# Patient Record
Sex: MALE | Race: WHITE | NOT HISPANIC OR LATINO | URBAN - METROPOLITAN AREA
[De-identification: names, ages, dates, MRNs, and addresses within clinical notes are randomized per-mention and may not be internally consistent; named-entity substitution may affect disease eponyms.]

---

## 2022-01-01 ENCOUNTER — OUTPATIENT (OUTPATIENT)
Dept: OUTPATIENT SERVICES | Facility: HOSPITAL | Age: 87
LOS: 1 days | End: 2022-01-01
Payer: MEDICARE

## 2022-01-01 ENCOUNTER — APPOINTMENT (OUTPATIENT)
Dept: FAMILY MEDICINE | Facility: CLINIC | Age: 87
End: 2022-01-01

## 2022-01-01 ENCOUNTER — NON-APPOINTMENT (OUTPATIENT)
Age: 87
End: 2022-01-01

## 2022-01-01 ENCOUNTER — APPOINTMENT (OUTPATIENT)
Dept: VASCULAR SURGERY | Facility: HOSPITAL | Age: 87
End: 2022-01-01

## 2022-01-01 ENCOUNTER — INPATIENT (INPATIENT)
Facility: HOSPITAL | Age: 87
LOS: 13 days | End: 2022-10-20
Attending: STUDENT IN AN ORGANIZED HEALTH CARE EDUCATION/TRAINING PROGRAM | Admitting: FAMILY MEDICINE

## 2022-01-01 ENCOUNTER — APPOINTMENT (OUTPATIENT)
Dept: VASCULAR SURGERY | Facility: CLINIC | Age: 87
End: 2022-01-01

## 2022-01-01 ENCOUNTER — TRANSCRIPTION ENCOUNTER (OUTPATIENT)
Age: 87
End: 2022-01-01

## 2022-01-01 ENCOUNTER — APPOINTMENT (OUTPATIENT)
Dept: ULTRASOUND IMAGING | Facility: CLINIC | Age: 87
End: 2022-01-01

## 2022-01-01 ENCOUNTER — INPATIENT (INPATIENT)
Facility: HOSPITAL | Age: 87
LOS: 3 days | Discharge: HOME HEALTH | End: 2022-10-03
Attending: INTERNAL MEDICINE | Admitting: INTERNAL MEDICINE

## 2022-01-01 ENCOUNTER — APPOINTMENT (OUTPATIENT)
Dept: INTERNAL MEDICINE | Facility: CLINIC | Age: 87
End: 2022-01-01

## 2022-01-01 ENCOUNTER — APPOINTMENT (OUTPATIENT)
Dept: CT IMAGING | Facility: CLINIC | Age: 87
End: 2022-01-01

## 2022-01-01 ENCOUNTER — APPOINTMENT (OUTPATIENT)
Dept: UROLOGY | Facility: CLINIC | Age: 87
End: 2022-01-01

## 2022-01-01 ENCOUNTER — APPOINTMENT (OUTPATIENT)
Dept: CARDIOLOGY | Facility: CLINIC | Age: 87
End: 2022-01-01

## 2022-01-01 VITALS
HEIGHT: 70 IN | WEIGHT: 130.07 LBS | DIASTOLIC BLOOD PRESSURE: 54 MMHG | TEMPERATURE: 100 F | OXYGEN SATURATION: 98 % | HEART RATE: 98 BPM | SYSTOLIC BLOOD PRESSURE: 91 MMHG | RESPIRATION RATE: 24 BRPM

## 2022-01-01 VITALS
SYSTOLIC BLOOD PRESSURE: 98 MMHG | TEMPERATURE: 98 F | DIASTOLIC BLOOD PRESSURE: 61 MMHG | HEIGHT: 70 IN | HEART RATE: 78 BPM | WEIGHT: 130.07 LBS | RESPIRATION RATE: 20 BRPM | OXYGEN SATURATION: 97 %

## 2022-01-01 VITALS — SYSTOLIC BLOOD PRESSURE: 110 MMHG | DIASTOLIC BLOOD PRESSURE: 72 MMHG | HEART RATE: 77 BPM | OXYGEN SATURATION: 95 %

## 2022-01-01 VITALS
HEART RATE: 56 BPM | DIASTOLIC BLOOD PRESSURE: 56 MMHG | SYSTOLIC BLOOD PRESSURE: 122 MMHG | TEMPERATURE: 98 F | OXYGEN SATURATION: 98 %

## 2022-01-01 VITALS
HEART RATE: 60 BPM | OXYGEN SATURATION: 96 % | DIASTOLIC BLOOD PRESSURE: 70 MMHG | SYSTOLIC BLOOD PRESSURE: 115 MMHG | TEMPERATURE: 97.4 F | HEIGHT: 70 IN | BODY MASS INDEX: 19.04 KG/M2 | WEIGHT: 133 LBS

## 2022-01-01 VITALS — RESPIRATION RATE: 35 BRPM

## 2022-01-01 VITALS
BODY MASS INDEX: 19.04 KG/M2 | SYSTOLIC BLOOD PRESSURE: 101 MMHG | OXYGEN SATURATION: 99 % | HEIGHT: 70 IN | TEMPERATURE: 97.6 F | WEIGHT: 133 LBS | DIASTOLIC BLOOD PRESSURE: 63 MMHG | HEART RATE: 77 BPM

## 2022-01-01 DIAGNOSIS — N17.9 ACUTE KIDNEY FAILURE, UNSPECIFIED: ICD-10-CM

## 2022-01-01 DIAGNOSIS — I71.40 ABDOMINAL AORTIC ANEURYSM, WITHOUT RUPTURE, UNSPECIFIED: ICD-10-CM

## 2022-01-01 DIAGNOSIS — E78.5 HYPERLIPIDEMIA, UNSPECIFIED: ICD-10-CM

## 2022-01-01 DIAGNOSIS — E86.0 DEHYDRATION: ICD-10-CM

## 2022-01-01 DIAGNOSIS — J18.9 PNEUMONIA, UNSPECIFIED ORGANISM: ICD-10-CM

## 2022-01-01 DIAGNOSIS — I26.99 OTHER PULMONARY EMBOLISM WITHOUT ACUTE COR PULMONALE: ICD-10-CM

## 2022-01-01 DIAGNOSIS — I50.22 CHRONIC SYSTOLIC (CONGESTIVE) HEART FAILURE: ICD-10-CM

## 2022-01-01 DIAGNOSIS — R26.2 DIFFICULTY IN WALKING, NOT ELSEWHERE CLASSIFIED: ICD-10-CM

## 2022-01-01 DIAGNOSIS — A41.89 OTHER SPECIFIED SEPSIS: ICD-10-CM

## 2022-01-01 DIAGNOSIS — U07.1 COVID-19: ICD-10-CM

## 2022-01-01 DIAGNOSIS — Z87.438 PERSONAL HISTORY OF OTHER DISEASES OF MALE GENITAL ORGANS: ICD-10-CM

## 2022-01-01 DIAGNOSIS — I80.10 PHLEBITIS AND THROMBOPHLEBITIS OF UNSPECIFIED FEMORAL VEIN: ICD-10-CM

## 2022-01-01 DIAGNOSIS — N40.0 BENIGN PROSTATIC HYPERPLASIA WITHOUT LOWER URINARY TRACT SYMPTOMS: ICD-10-CM

## 2022-01-01 DIAGNOSIS — I71.4 ABDOMINAL AORTIC ANEURYSM, WITHOUT RUPTURE: ICD-10-CM

## 2022-01-01 DIAGNOSIS — J12.82 PNEUMONIA DUE TO CORONAVIRUS DISEASE 2019: ICD-10-CM

## 2022-01-01 DIAGNOSIS — I11.0 HYPERTENSIVE HEART DISEASE WITH HEART FAILURE: ICD-10-CM

## 2022-01-01 DIAGNOSIS — I82.402 ACUTE EMBOLISM AND THROMBOSIS OF UNSPECIFIED DEEP VEINS OF LEFT LOWER EXTREMITY: ICD-10-CM

## 2022-01-01 DIAGNOSIS — I50.9 HEART FAILURE, UNSPECIFIED: ICD-10-CM

## 2022-01-01 DIAGNOSIS — Z00.00 ENCOUNTER FOR GENERAL ADULT MEDICAL EXAMINATION W/OUT ABNORMAL FINDINGS: ICD-10-CM

## 2022-01-01 DIAGNOSIS — R53.1 WEAKNESS: ICD-10-CM

## 2022-01-01 DIAGNOSIS — E87.0 HYPEROSMOLALITY AND HYPERNATREMIA: ICD-10-CM

## 2022-01-01 DIAGNOSIS — N40.0 BENIGN PROSTATIC HYPERPLASIA WITHOUT LOWER URINARY TRACT SYMPMS: ICD-10-CM

## 2022-01-01 DIAGNOSIS — I71.4 ABDOMINAL AORTIC ANEURYSM, W/OUT RUPTURE: ICD-10-CM

## 2022-01-01 DIAGNOSIS — I10 ESSENTIAL (PRIMARY) HYPERTENSION: ICD-10-CM

## 2022-01-01 LAB
-  AMIKACIN: SIGNIFICANT CHANGE UP
-  AMOXICILLIN/CLAVULANIC ACID: SIGNIFICANT CHANGE UP
-  AMPICILLIN/SULBACTAM: SIGNIFICANT CHANGE UP
-  AMPICILLIN: SIGNIFICANT CHANGE UP
-  AZTREONAM: SIGNIFICANT CHANGE UP
-  CEFAZOLIN: SIGNIFICANT CHANGE UP
-  CEFEPIME: SIGNIFICANT CHANGE UP
-  CEFTRIAXONE: SIGNIFICANT CHANGE UP
-  CIPROFLOXACIN: SIGNIFICANT CHANGE UP
-  ERTAPENEM: SIGNIFICANT CHANGE UP
-  GENTAMICIN: SIGNIFICANT CHANGE UP
-  IMIPENEM: SIGNIFICANT CHANGE UP
-  LEVOFLOXACIN: SIGNIFICANT CHANGE UP
-  MEROPENEM: SIGNIFICANT CHANGE UP
-  PIPERACILLIN/TAZOBACTAM: SIGNIFICANT CHANGE UP
-  TOBRAMYCIN: SIGNIFICANT CHANGE UP
-  TRIMETHOPRIM/SULFAMETHOXAZOLE: SIGNIFICANT CHANGE UP
25(OH)D3 SERPL-MCNC: 59.9 NG/ML
ACANTHOCYTES BLD QL SMEAR: SIGNIFICANT CHANGE UP
ALBUMIN SERPL ELPH-MCNC: 1.6 G/DL — LOW (ref 3.3–5)
ALBUMIN SERPL ELPH-MCNC: 1.7 G/DL — LOW (ref 3.3–5)
ALBUMIN SERPL ELPH-MCNC: 1.8 G/DL — LOW (ref 3.3–5)
ALBUMIN SERPL ELPH-MCNC: 2 G/DL — LOW (ref 3.3–5)
ALBUMIN SERPL ELPH-MCNC: 2 G/DL — LOW (ref 3.3–5)
ALBUMIN SERPL ELPH-MCNC: 2.3 G/DL — LOW (ref 3.3–5)
ALBUMIN SERPL ELPH-MCNC: 2.3 G/DL — LOW (ref 3.3–5)
ALBUMIN SERPL ELPH-MCNC: 2.4 G/DL — LOW (ref 3.3–5)
ALBUMIN SERPL ELPH-MCNC: 2.4 G/DL — LOW (ref 3.3–5)
ALBUMIN SERPL ELPH-MCNC: 2.8 G/DL — LOW (ref 3.3–5)
ALBUMIN SERPL ELPH-MCNC: 3.4 G/DL
ALP BLD-CCNC: 84 U/L
ALP SERPL-CCNC: 64 U/L — SIGNIFICANT CHANGE UP (ref 40–120)
ALP SERPL-CCNC: 67 U/L — SIGNIFICANT CHANGE UP (ref 40–120)
ALP SERPL-CCNC: 68 U/L — SIGNIFICANT CHANGE UP (ref 40–120)
ALP SERPL-CCNC: 70 U/L — SIGNIFICANT CHANGE UP (ref 40–120)
ALP SERPL-CCNC: 73 U/L — SIGNIFICANT CHANGE UP (ref 40–120)
ALP SERPL-CCNC: 76 U/L — SIGNIFICANT CHANGE UP (ref 40–120)
ALP SERPL-CCNC: 77 U/L — SIGNIFICANT CHANGE UP (ref 40–120)
ALP SERPL-CCNC: 78 U/L — SIGNIFICANT CHANGE UP (ref 40–120)
ALP SERPL-CCNC: 84 U/L — SIGNIFICANT CHANGE UP (ref 40–120)
ALP SERPL-CCNC: 98 U/L — SIGNIFICANT CHANGE UP (ref 40–120)
ALT FLD-CCNC: 18 U/L — SIGNIFICANT CHANGE UP (ref 12–78)
ALT FLD-CCNC: 21 U/L — SIGNIFICANT CHANGE UP (ref 12–78)
ALT FLD-CCNC: 22 U/L — SIGNIFICANT CHANGE UP (ref 12–78)
ALT FLD-CCNC: 22 U/L — SIGNIFICANT CHANGE UP (ref 12–78)
ALT FLD-CCNC: 24 U/L — SIGNIFICANT CHANGE UP (ref 12–78)
ALT FLD-CCNC: 25 U/L — SIGNIFICANT CHANGE UP (ref 12–78)
ALT FLD-CCNC: 25 U/L — SIGNIFICANT CHANGE UP (ref 12–78)
ALT FLD-CCNC: 32 U/L — SIGNIFICANT CHANGE UP (ref 12–78)
ALT FLD-CCNC: 35 U/L — SIGNIFICANT CHANGE UP (ref 12–78)
ALT FLD-CCNC: 49 U/L — SIGNIFICANT CHANGE UP (ref 12–78)
ALT SERPL-CCNC: 15 U/L
ANION GAP SERPL CALC-SCNC: 10 MMOL/L
ANION GAP SERPL CALC-SCNC: 3 MMOL/L — LOW (ref 5–17)
ANION GAP SERPL CALC-SCNC: 5 MMOL/L — SIGNIFICANT CHANGE UP (ref 5–17)
ANION GAP SERPL CALC-SCNC: 6 MMOL/L — SIGNIFICANT CHANGE UP (ref 5–17)
ANION GAP SERPL CALC-SCNC: 7 MMOL/L — SIGNIFICANT CHANGE UP (ref 5–17)
ANION GAP SERPL CALC-SCNC: 8 MMOL/L — SIGNIFICANT CHANGE UP (ref 5–17)
ANISOCYTOSIS BLD QL: SIGNIFICANT CHANGE UP
ANISOCYTOSIS BLD QL: SLIGHT — SIGNIFICANT CHANGE UP
APPEARANCE UR: CLEAR — SIGNIFICANT CHANGE UP
APTT BLD: 32.4 SEC — SIGNIFICANT CHANGE UP (ref 27.5–35.5)
APTT BLD: 33.3 SEC — SIGNIFICANT CHANGE UP (ref 27.5–35.5)
APTT BLD: 34.1 SEC — SIGNIFICANT CHANGE UP (ref 27.5–35.5)
AST SERPL-CCNC: 19 U/L
AST SERPL-CCNC: 19 U/L — SIGNIFICANT CHANGE UP (ref 15–37)
AST SERPL-CCNC: 20 U/L — SIGNIFICANT CHANGE UP (ref 15–37)
AST SERPL-CCNC: 22 U/L — SIGNIFICANT CHANGE UP (ref 15–37)
AST SERPL-CCNC: 24 U/L — SIGNIFICANT CHANGE UP (ref 15–37)
AST SERPL-CCNC: 30 U/L — SIGNIFICANT CHANGE UP (ref 15–37)
AST SERPL-CCNC: 30 U/L — SIGNIFICANT CHANGE UP (ref 15–37)
AST SERPL-CCNC: 34 U/L — SIGNIFICANT CHANGE UP (ref 15–37)
AST SERPL-CCNC: 38 U/L — HIGH (ref 15–37)
BASE EXCESS BLDA CALC-SCNC: -11 MMOL/L — LOW (ref -2–3)
BASE EXCESS BLDA CALC-SCNC: -2.2 MMOL/L — LOW (ref -2–3)
BASE EXCESS BLDA CALC-SCNC: -2.2 MMOL/L — LOW (ref -2–3)
BASE EXCESS BLDA CALC-SCNC: -3.9 MMOL/L — LOW (ref -2–3)
BASE EXCESS BLDA CALC-SCNC: 0.1 MMOL/L — SIGNIFICANT CHANGE UP (ref -2–3)
BASE EXCESS BLDA CALC-SCNC: 3.9 MMOL/L — HIGH (ref -2–3)
BASE EXCESS BLDA CALC-SCNC: 4.7 MMOL/L — HIGH (ref -2–3)
BASOPHILS # BLD AUTO: 0 K/UL — SIGNIFICANT CHANGE UP (ref 0–0.2)
BASOPHILS # BLD AUTO: 0.01 K/UL — SIGNIFICANT CHANGE UP (ref 0–0.2)
BASOPHILS # BLD AUTO: 0.02 K/UL — SIGNIFICANT CHANGE UP (ref 0–0.2)
BASOPHILS # BLD AUTO: 0.02 K/UL — SIGNIFICANT CHANGE UP (ref 0–0.2)
BASOPHILS # BLD AUTO: 0.03 K/UL
BASOPHILS # BLD AUTO: 0.03 K/UL — SIGNIFICANT CHANGE UP (ref 0–0.2)
BASOPHILS # BLD AUTO: 0.08 K/UL — SIGNIFICANT CHANGE UP (ref 0–0.2)
BASOPHILS NFR BLD AUTO: 0 % — SIGNIFICANT CHANGE UP (ref 0–2)
BASOPHILS NFR BLD AUTO: 0.1 % — SIGNIFICANT CHANGE UP (ref 0–2)
BASOPHILS NFR BLD AUTO: 0.2 % — SIGNIFICANT CHANGE UP (ref 0–2)
BASOPHILS NFR BLD AUTO: 0.3 % — SIGNIFICANT CHANGE UP (ref 0–2)
BASOPHILS NFR BLD AUTO: 0.3 % — SIGNIFICANT CHANGE UP (ref 0–2)
BASOPHILS NFR BLD AUTO: 0.4 %
BASOPHILS NFR BLD AUTO: 1 % — SIGNIFICANT CHANGE UP (ref 0–2)
BILIRUB SERPL-MCNC: 0.3 MG/DL — SIGNIFICANT CHANGE UP (ref 0.2–1.2)
BILIRUB SERPL-MCNC: 0.4 MG/DL
BILIRUB SERPL-MCNC: 0.4 MG/DL — SIGNIFICANT CHANGE UP (ref 0.2–1.2)
BILIRUB SERPL-MCNC: 0.5 MG/DL — SIGNIFICANT CHANGE UP (ref 0.2–1.2)
BILIRUB SERPL-MCNC: 0.6 MG/DL — SIGNIFICANT CHANGE UP (ref 0.2–1.2)
BILIRUB UR-MCNC: NEGATIVE — SIGNIFICANT CHANGE UP
BLD GP AB SCN SERPL QL: SIGNIFICANT CHANGE UP
BLOOD GAS COMMENTS ARTERIAL: SIGNIFICANT CHANGE UP
BUN SERPL-MCNC: 19 MG/DL — SIGNIFICANT CHANGE UP (ref 7–23)
BUN SERPL-MCNC: 20 MG/DL — SIGNIFICANT CHANGE UP (ref 7–23)
BUN SERPL-MCNC: 21 MG/DL — SIGNIFICANT CHANGE UP (ref 7–23)
BUN SERPL-MCNC: 22 MG/DL — SIGNIFICANT CHANGE UP (ref 7–23)
BUN SERPL-MCNC: 23 MG/DL — SIGNIFICANT CHANGE UP (ref 7–23)
BUN SERPL-MCNC: 24 MG/DL — HIGH (ref 7–23)
BUN SERPL-MCNC: 25 MG/DL — HIGH (ref 7–23)
BUN SERPL-MCNC: 27 MG/DL — HIGH (ref 7–23)
BUN SERPL-MCNC: 29 MG/DL — HIGH (ref 7–23)
BUN SERPL-MCNC: 29 MG/DL — HIGH (ref 7–23)
BUN SERPL-MCNC: 30 MG/DL — HIGH (ref 7–23)
BUN SERPL-MCNC: 31 MG/DL — HIGH (ref 7–23)
BUN SERPL-MCNC: 32 MG/DL — HIGH (ref 7–23)
BUN SERPL-MCNC: 34 MG/DL
BUN SERPL-MCNC: 34 MG/DL — HIGH (ref 7–23)
BUN SERPL-MCNC: 34 MG/DL — HIGH (ref 7–23)
CALCIUM SERPL-MCNC: 7.6 MG/DL — LOW (ref 8.5–10.1)
CALCIUM SERPL-MCNC: 7.7 MG/DL — LOW (ref 8.5–10.1)
CALCIUM SERPL-MCNC: 7.9 MG/DL — LOW (ref 8.5–10.1)
CALCIUM SERPL-MCNC: 8.1 MG/DL — LOW (ref 8.5–10.1)
CALCIUM SERPL-MCNC: 8.2 MG/DL — LOW (ref 8.5–10.1)
CALCIUM SERPL-MCNC: 8.2 MG/DL — LOW (ref 8.5–10.1)
CALCIUM SERPL-MCNC: 8.4 MG/DL — LOW (ref 8.5–10.1)
CALCIUM SERPL-MCNC: 8.5 MG/DL — SIGNIFICANT CHANGE UP (ref 8.5–10.1)
CALCIUM SERPL-MCNC: 8.6 MG/DL — SIGNIFICANT CHANGE UP (ref 8.5–10.1)
CALCIUM SERPL-MCNC: 8.7 MG/DL — SIGNIFICANT CHANGE UP (ref 8.5–10.1)
CALCIUM SERPL-MCNC: 8.7 MG/DL — SIGNIFICANT CHANGE UP (ref 8.5–10.1)
CALCIUM SERPL-MCNC: 9 MG/DL — SIGNIFICANT CHANGE UP (ref 8.5–10.1)
CALCIUM SERPL-MCNC: 9.4 MG/DL
CHLORIDE SERPL-SCNC: 102 MMOL/L
CHLORIDE SERPL-SCNC: 109 MMOL/L — HIGH (ref 96–108)
CHLORIDE SERPL-SCNC: 109 MMOL/L — HIGH (ref 96–108)
CHLORIDE SERPL-SCNC: 110 MMOL/L — HIGH (ref 96–108)
CHLORIDE SERPL-SCNC: 110 MMOL/L — HIGH (ref 96–108)
CHLORIDE SERPL-SCNC: 111 MMOL/L — HIGH (ref 96–108)
CHLORIDE SERPL-SCNC: 112 MMOL/L — HIGH (ref 96–108)
CHLORIDE SERPL-SCNC: 113 MMOL/L — HIGH (ref 96–108)
CHLORIDE SERPL-SCNC: 114 MMOL/L — HIGH (ref 96–108)
CHLORIDE SERPL-SCNC: 114 MMOL/L — HIGH (ref 96–108)
CHLORIDE SERPL-SCNC: 115 MMOL/L — HIGH (ref 96–108)
CHLORIDE SERPL-SCNC: 115 MMOL/L — HIGH (ref 96–108)
CHLORIDE SERPL-SCNC: 116 MMOL/L — HIGH (ref 96–108)
CHLORIDE SERPL-SCNC: 117 MMOL/L — HIGH (ref 96–108)
CHLORIDE SERPL-SCNC: 118 MMOL/L — HIGH (ref 96–108)
CHLORIDE SERPL-SCNC: 119 MMOL/L — HIGH (ref 96–108)
CHOLEST SERPL-MCNC: 186 MG/DL
CO2 BLDA-SCNC: 23 MMOL/L — SIGNIFICANT CHANGE UP (ref 19–24)
CO2 BLDA-SCNC: 25 MMOL/L — HIGH (ref 19–24)
CO2 BLDA-SCNC: 29 MMOL/L — HIGH (ref 19–24)
CO2 BLDA-SCNC: 30 MMOL/L — HIGH (ref 19–24)
CO2 BLDA-SCNC: 31 MMOL/L — HIGH (ref 19–24)
CO2 BLDA-SCNC: 33 MMOL/L — HIGH (ref 19–24)
CO2 BLDA-SCNC: 33 MMOL/L — HIGH (ref 19–24)
CO2 SERPL-SCNC: 21 MMOL/L — LOW (ref 22–31)
CO2 SERPL-SCNC: 22 MMOL/L — SIGNIFICANT CHANGE UP (ref 22–31)
CO2 SERPL-SCNC: 22 MMOL/L — SIGNIFICANT CHANGE UP (ref 22–31)
CO2 SERPL-SCNC: 23 MMOL/L — SIGNIFICANT CHANGE UP (ref 22–31)
CO2 SERPL-SCNC: 23 MMOL/L — SIGNIFICANT CHANGE UP (ref 22–31)
CO2 SERPL-SCNC: 25 MMOL/L — SIGNIFICANT CHANGE UP (ref 22–31)
CO2 SERPL-SCNC: 26 MMOL/L — SIGNIFICANT CHANGE UP (ref 22–31)
CO2 SERPL-SCNC: 27 MMOL/L — SIGNIFICANT CHANGE UP (ref 22–31)
CO2 SERPL-SCNC: 27 MMOL/L — SIGNIFICANT CHANGE UP (ref 22–31)
CO2 SERPL-SCNC: 28 MMOL/L — SIGNIFICANT CHANGE UP (ref 22–31)
CO2 SERPL-SCNC: 29 MMOL/L
CO2 SERPL-SCNC: 29 MMOL/L — SIGNIFICANT CHANGE UP (ref 22–31)
CO2 SERPL-SCNC: 30 MMOL/L — SIGNIFICANT CHANGE UP (ref 22–31)
CO2 SERPL-SCNC: 30 MMOL/L — SIGNIFICANT CHANGE UP (ref 22–31)
CO2 SERPL-SCNC: 31 MMOL/L — SIGNIFICANT CHANGE UP (ref 22–31)
COLOR SPEC: YELLOW — SIGNIFICANT CHANGE UP
CREAT SERPL-MCNC: 0.84 MG/DL — SIGNIFICANT CHANGE UP (ref 0.5–1.3)
CREAT SERPL-MCNC: 0.86 MG/DL — SIGNIFICANT CHANGE UP (ref 0.5–1.3)
CREAT SERPL-MCNC: 0.91 MG/DL — SIGNIFICANT CHANGE UP (ref 0.5–1.3)
CREAT SERPL-MCNC: 0.92 MG/DL — SIGNIFICANT CHANGE UP (ref 0.5–1.3)
CREAT SERPL-MCNC: 0.96 MG/DL — SIGNIFICANT CHANGE UP (ref 0.5–1.3)
CREAT SERPL-MCNC: 1.05 MG/DL — SIGNIFICANT CHANGE UP (ref 0.5–1.3)
CREAT SERPL-MCNC: 1.07 MG/DL — SIGNIFICANT CHANGE UP (ref 0.5–1.3)
CREAT SERPL-MCNC: 1.07 MG/DL — SIGNIFICANT CHANGE UP (ref 0.5–1.3)
CREAT SERPL-MCNC: 1.1 MG/DL — SIGNIFICANT CHANGE UP (ref 0.5–1.3)
CREAT SERPL-MCNC: 1.12 MG/DL — SIGNIFICANT CHANGE UP (ref 0.5–1.3)
CREAT SERPL-MCNC: 1.12 MG/DL — SIGNIFICANT CHANGE UP (ref 0.5–1.3)
CREAT SERPL-MCNC: 1.13 MG/DL — SIGNIFICANT CHANGE UP (ref 0.5–1.3)
CREAT SERPL-MCNC: 1.15 MG/DL — SIGNIFICANT CHANGE UP (ref 0.5–1.3)
CREAT SERPL-MCNC: 1.17 MG/DL — SIGNIFICANT CHANGE UP (ref 0.5–1.3)
CREAT SERPL-MCNC: 1.19 MG/DL — SIGNIFICANT CHANGE UP (ref 0.5–1.3)
CREAT SERPL-MCNC: 1.29 MG/DL
CREAT SERPL-MCNC: 1.29 MG/DL — SIGNIFICANT CHANGE UP (ref 0.5–1.3)
CREAT SERPL-MCNC: 1.57 MG/DL — HIGH (ref 0.5–1.3)
CRP SERPL-MCNC: 297 MG/L — HIGH
CULTURE RESULTS: NO GROWTH — SIGNIFICANT CHANGE UP
CULTURE RESULTS: SIGNIFICANT CHANGE UP
D DIMER BLD IA.RAPID-MCNC: 7759 NG/ML DDU — HIGH
DIFF PNL FLD: NEGATIVE — SIGNIFICANT CHANGE UP
EGFR: 42 ML/MIN/1.73M2 — LOW
EGFR: 53 ML/MIN/1.73M2
EGFR: 53 ML/MIN/1.73M2 — LOW
EGFR: 58 ML/MIN/1.73M2 — LOW
EGFR: 60 ML/MIN/1.73M2 — SIGNIFICANT CHANGE UP
EGFR: 61 ML/MIN/1.73M2 — SIGNIFICANT CHANGE UP
EGFR: 62 ML/MIN/1.73M2 — SIGNIFICANT CHANGE UP
EGFR: 63 ML/MIN/1.73M2 — SIGNIFICANT CHANGE UP
EGFR: 63 ML/MIN/1.73M2 — SIGNIFICANT CHANGE UP
EGFR: 64 ML/MIN/1.73M2 — SIGNIFICANT CHANGE UP
EGFR: 66 ML/MIN/1.73M2 — SIGNIFICANT CHANGE UP
EGFR: 66 ML/MIN/1.73M2 — SIGNIFICANT CHANGE UP
EGFR: 68 ML/MIN/1.73M2 — SIGNIFICANT CHANGE UP
EGFR: 76 ML/MIN/1.73M2 — SIGNIFICANT CHANGE UP
EGFR: 80 ML/MIN/1.73M2 — SIGNIFICANT CHANGE UP
EGFR: 81 ML/MIN/1.73M2 — SIGNIFICANT CHANGE UP
EGFR: 83 ML/MIN/1.73M2 — SIGNIFICANT CHANGE UP
EGFR: 83 ML/MIN/1.73M2 — SIGNIFICANT CHANGE UP
EOSINOPHIL # BLD AUTO: 0 K/UL — SIGNIFICANT CHANGE UP (ref 0–0.5)
EOSINOPHIL # BLD AUTO: 0.01 K/UL — SIGNIFICANT CHANGE UP (ref 0–0.5)
EOSINOPHIL # BLD AUTO: 0.02 K/UL — SIGNIFICANT CHANGE UP (ref 0–0.5)
EOSINOPHIL # BLD AUTO: 0.16 K/UL
EOSINOPHIL NFR BLD AUTO: 0 % — SIGNIFICANT CHANGE UP (ref 0–6)
EOSINOPHIL NFR BLD AUTO: 0.1 % — SIGNIFICANT CHANGE UP (ref 0–6)
EOSINOPHIL NFR BLD AUTO: 0.3 % — SIGNIFICANT CHANGE UP (ref 0–6)
EOSINOPHIL NFR BLD AUTO: 2.1 %
ESTIMATED AVERAGE GLUCOSE: 126 MG/DL
FERRITIN SERPL-MCNC: 5099 NG/ML — HIGH (ref 30–400)
FLUAV AG NPH QL: SIGNIFICANT CHANGE UP
FLUBV AG NPH QL: SIGNIFICANT CHANGE UP
GAS PNL BLDA: SIGNIFICANT CHANGE UP
GLUCOSE BLDC GLUCOMTR-MCNC: 116 MG/DL — HIGH (ref 70–99)
GLUCOSE BLDC GLUCOMTR-MCNC: 125 MG/DL — HIGH (ref 70–99)
GLUCOSE BLDC GLUCOMTR-MCNC: 141 MG/DL — HIGH (ref 70–99)
GLUCOSE BLDC GLUCOMTR-MCNC: 145 MG/DL — HIGH (ref 70–99)
GLUCOSE BLDC GLUCOMTR-MCNC: 98 MG/DL — SIGNIFICANT CHANGE UP (ref 70–99)
GLUCOSE SERPL-MCNC: 100 MG/DL — HIGH (ref 70–99)
GLUCOSE SERPL-MCNC: 102 MG/DL — HIGH (ref 70–99)
GLUCOSE SERPL-MCNC: 105 MG/DL — HIGH (ref 70–99)
GLUCOSE SERPL-MCNC: 112 MG/DL — HIGH (ref 70–99)
GLUCOSE SERPL-MCNC: 123 MG/DL — HIGH (ref 70–99)
GLUCOSE SERPL-MCNC: 126 MG/DL — HIGH (ref 70–99)
GLUCOSE SERPL-MCNC: 130 MG/DL
GLUCOSE SERPL-MCNC: 131 MG/DL — HIGH (ref 70–99)
GLUCOSE SERPL-MCNC: 132 MG/DL — HIGH (ref 70–99)
GLUCOSE SERPL-MCNC: 132 MG/DL — HIGH (ref 70–99)
GLUCOSE SERPL-MCNC: 136 MG/DL — HIGH (ref 70–99)
GLUCOSE SERPL-MCNC: 137 MG/DL — HIGH (ref 70–99)
GLUCOSE SERPL-MCNC: 163 MG/DL — HIGH (ref 70–99)
GLUCOSE SERPL-MCNC: 170 MG/DL — HIGH (ref 70–99)
GLUCOSE SERPL-MCNC: 88 MG/DL — SIGNIFICANT CHANGE UP (ref 70–99)
GLUCOSE SERPL-MCNC: 90 MG/DL — SIGNIFICANT CHANGE UP (ref 70–99)
GLUCOSE SERPL-MCNC: 93 MG/DL — SIGNIFICANT CHANGE UP (ref 70–99)
GLUCOSE SERPL-MCNC: 99 MG/DL — SIGNIFICANT CHANGE UP (ref 70–99)
GLUCOSE UR QL: NEGATIVE MG/DL — SIGNIFICANT CHANGE UP
GRAM STN FLD: SIGNIFICANT CHANGE UP
GRAM STN FLD: SIGNIFICANT CHANGE UP
HBA1C MFR BLD HPLC: 6 %
HCO3 BLDA-SCNC: 20 MMOL/L — LOW (ref 21–28)
HCO3 BLDA-SCNC: 24 MMOL/L — SIGNIFICANT CHANGE UP (ref 21–28)
HCO3 BLDA-SCNC: 27 MMOL/L — SIGNIFICANT CHANGE UP (ref 21–28)
HCO3 BLDA-SCNC: 28 MMOL/L — SIGNIFICANT CHANGE UP (ref 21–28)
HCO3 BLDA-SCNC: 30 MMOL/L — HIGH (ref 21–28)
HCT VFR BLD CALC: 19.5 % — CRITICAL LOW (ref 39–50)
HCT VFR BLD CALC: 20.1 % — CRITICAL LOW (ref 39–50)
HCT VFR BLD CALC: 21.4 % — LOW (ref 39–50)
HCT VFR BLD CALC: 23.2 % — LOW (ref 39–50)
HCT VFR BLD CALC: 23.4 % — LOW (ref 39–50)
HCT VFR BLD CALC: 23.7 % — LOW (ref 39–50)
HCT VFR BLD CALC: 23.9 % — LOW (ref 39–50)
HCT VFR BLD CALC: 24.7 % — LOW (ref 39–50)
HCT VFR BLD CALC: 25.2 % — LOW (ref 39–50)
HCT VFR BLD CALC: 25.5 % — LOW (ref 39–50)
HCT VFR BLD CALC: 25.8 % — LOW (ref 39–50)
HCT VFR BLD CALC: 26.2 % — LOW (ref 39–50)
HCT VFR BLD CALC: 26.4 % — LOW (ref 39–50)
HCT VFR BLD CALC: 27.2 % — LOW (ref 39–50)
HCT VFR BLD CALC: 27.3 % — LOW (ref 39–50)
HCT VFR BLD CALC: 27.3 % — LOW (ref 39–50)
HCT VFR BLD CALC: 29.8 % — LOW (ref 39–50)
HCT VFR BLD CALC: 29.8 % — LOW (ref 39–50)
HCT VFR BLD CALC: 30.3 % — LOW (ref 39–50)
HCT VFR BLD CALC: 30.4 % — LOW (ref 39–50)
HCT VFR BLD CALC: 30.5 % — LOW (ref 39–50)
HCT VFR BLD CALC: 31 % — LOW (ref 39–50)
HCT VFR BLD CALC: 33.2 % — LOW (ref 39–50)
HCT VFR BLD CALC: 37.7 %
HDLC SERPL-MCNC: 55 MG/DL
HGB BLD-MCNC: 10.1 G/DL — LOW (ref 13–17)
HGB BLD-MCNC: 11.4 G/DL
HGB BLD-MCNC: 6.1 G/DL — CRITICAL LOW (ref 13–17)
HGB BLD-MCNC: 6.3 G/DL — CRITICAL LOW (ref 13–17)
HGB BLD-MCNC: 7 G/DL — CRITICAL LOW (ref 13–17)
HGB BLD-MCNC: 7.2 G/DL — LOW (ref 13–17)
HGB BLD-MCNC: 7.2 G/DL — LOW (ref 13–17)
HGB BLD-MCNC: 7.3 G/DL — LOW (ref 13–17)
HGB BLD-MCNC: 7.4 G/DL — LOW (ref 13–17)
HGB BLD-MCNC: 7.5 G/DL — LOW (ref 13–17)
HGB BLD-MCNC: 7.8 G/DL — LOW (ref 13–17)
HGB BLD-MCNC: 7.9 G/DL — LOW (ref 13–17)
HGB BLD-MCNC: 8.1 G/DL — LOW (ref 13–17)
HGB BLD-MCNC: 8.2 G/DL — LOW (ref 13–17)
HGB BLD-MCNC: 8.2 G/DL — LOW (ref 13–17)
HGB BLD-MCNC: 8.5 G/DL — LOW (ref 13–17)
HGB BLD-MCNC: 9.2 G/DL — LOW (ref 13–17)
HGB BLD-MCNC: 9.4 G/DL — LOW (ref 13–17)
HGB BLD-MCNC: 9.4 G/DL — LOW (ref 13–17)
HGB BLD-MCNC: 9.5 G/DL — LOW (ref 13–17)
HGB BLD-MCNC: 9.5 G/DL — LOW (ref 13–17)
HGB BLD-MCNC: 9.6 G/DL — LOW (ref 13–17)
HOROWITZ INDEX BLDA+IHG-RTO: 100 — SIGNIFICANT CHANGE UP
HOROWITZ INDEX BLDA+IHG-RTO: 70 — SIGNIFICANT CHANGE UP
HOROWITZ INDEX BLDA+IHG-RTO: 70 — SIGNIFICANT CHANGE UP
HYPOCHROMIA BLD QL: SLIGHT — SIGNIFICANT CHANGE UP
IMM GRANULOCYTES NFR BLD AUTO: 0.8 %
IMM GRANULOCYTES NFR BLD AUTO: 0.8 % — SIGNIFICANT CHANGE UP (ref 0–0.9)
IMM GRANULOCYTES NFR BLD AUTO: 0.9 % — SIGNIFICANT CHANGE UP (ref 0–0.9)
IMM GRANULOCYTES NFR BLD AUTO: 1.1 % — HIGH (ref 0–0.9)
IMM GRANULOCYTES NFR BLD AUTO: 1.1 % — HIGH (ref 0–0.9)
IMM GRANULOCYTES NFR BLD AUTO: 1.7 % — HIGH (ref 0–0.9)
IMM GRANULOCYTES NFR BLD AUTO: 2.7 % — HIGH (ref 0–0.9)
INR BLD: 0.95 RATIO — SIGNIFICANT CHANGE UP (ref 0.88–1.16)
INR BLD: 1.05 RATIO — SIGNIFICANT CHANGE UP (ref 0.88–1.16)
INR BLD: 1.13 RATIO — SIGNIFICANT CHANGE UP (ref 0.88–1.16)
KETONES UR-MCNC: NEGATIVE — SIGNIFICANT CHANGE UP
LACTATE SERPL-SCNC: 0.9 MMOL/L — SIGNIFICANT CHANGE UP (ref 0.7–2)
LACTATE SERPL-SCNC: 2 MMOL/L — SIGNIFICANT CHANGE UP (ref 0.7–2)
LDH SERPL L TO P-CCNC: 519 U/L — HIGH (ref 50–242)
LDLC SERPL CALC-MCNC: 110 MG/DL
LEUKOCYTE ESTERASE UR-ACNC: NEGATIVE — SIGNIFICANT CHANGE UP
LYMPHOCYTES # BLD AUTO: 0.08 K/UL — LOW (ref 1–3.3)
LYMPHOCYTES # BLD AUTO: 0.17 K/UL — LOW (ref 1–3.3)
LYMPHOCYTES # BLD AUTO: 0.21 K/UL — LOW (ref 1–3.3)
LYMPHOCYTES # BLD AUTO: 0.32 K/UL — LOW (ref 1–3.3)
LYMPHOCYTES # BLD AUTO: 0.33 K/UL — LOW (ref 1–3.3)
LYMPHOCYTES # BLD AUTO: 0.53 K/UL — LOW (ref 1–3.3)
LYMPHOCYTES # BLD AUTO: 0.68 K/UL — LOW (ref 1–3.3)
LYMPHOCYTES # BLD AUTO: 0.95 K/UL
LYMPHOCYTES # BLD AUTO: 1 % — LOW (ref 13–44)
LYMPHOCYTES # BLD AUTO: 1 % — LOW (ref 13–44)
LYMPHOCYTES # BLD AUTO: 1.15 K/UL — SIGNIFICANT CHANGE UP (ref 1–3.3)
LYMPHOCYTES # BLD AUTO: 10.7 % — LOW (ref 13–44)
LYMPHOCYTES # BLD AUTO: 11.3 % — LOW (ref 13–44)
LYMPHOCYTES # BLD AUTO: 3.7 % — LOW (ref 13–44)
LYMPHOCYTES # BLD AUTO: 3.8 % — LOW (ref 13–44)
LYMPHOCYTES # BLD AUTO: 6.1 % — LOW (ref 13–44)
LYMPHOCYTES # BLD AUTO: 7.7 % — LOW (ref 13–44)
LYMPHOCYTES NFR BLD AUTO: 12.5 %
MACROCYTES BLD QL: SLIGHT — SIGNIFICANT CHANGE UP
MAGNESIUM SERPL-MCNC: 1.9 MG/DL — SIGNIFICANT CHANGE UP (ref 1.6–2.6)
MAGNESIUM SERPL-MCNC: 2 MG/DL — SIGNIFICANT CHANGE UP (ref 1.6–2.6)
MAGNESIUM SERPL-MCNC: 2.1 MG/DL — SIGNIFICANT CHANGE UP (ref 1.6–2.6)
MAGNESIUM SERPL-MCNC: 2.1 MG/DL — SIGNIFICANT CHANGE UP (ref 1.6–2.6)
MAGNESIUM SERPL-MCNC: 2.2 MG/DL — SIGNIFICANT CHANGE UP (ref 1.6–2.6)
MAGNESIUM SERPL-MCNC: 2.2 MG/DL — SIGNIFICANT CHANGE UP (ref 1.6–2.6)
MAGNESIUM SERPL-MCNC: 2.3 MG/DL — SIGNIFICANT CHANGE UP (ref 1.6–2.6)
MAGNESIUM SERPL-MCNC: 2.6 MG/DL — SIGNIFICANT CHANGE UP (ref 1.6–2.6)
MAN DIFF?: NORMAL
MANUAL SMEAR VERIFICATION: SIGNIFICANT CHANGE UP
MANUAL SMEAR VERIFICATION: SIGNIFICANT CHANGE UP
MCHC RBC-ENTMCNC: 27.1 PG — SIGNIFICANT CHANGE UP (ref 27–34)
MCHC RBC-ENTMCNC: 27.2 PG — SIGNIFICANT CHANGE UP (ref 27–34)
MCHC RBC-ENTMCNC: 27.3 PG — SIGNIFICANT CHANGE UP (ref 27–34)
MCHC RBC-ENTMCNC: 27.4 PG — SIGNIFICANT CHANGE UP (ref 27–34)
MCHC RBC-ENTMCNC: 27.4 PG — SIGNIFICANT CHANGE UP (ref 27–34)
MCHC RBC-ENTMCNC: 27.5 PG — SIGNIFICANT CHANGE UP (ref 27–34)
MCHC RBC-ENTMCNC: 27.5 PG — SIGNIFICANT CHANGE UP (ref 27–34)
MCHC RBC-ENTMCNC: 27.7 PG — SIGNIFICANT CHANGE UP (ref 27–34)
MCHC RBC-ENTMCNC: 27.7 PG — SIGNIFICANT CHANGE UP (ref 27–34)
MCHC RBC-ENTMCNC: 27.8 PG — SIGNIFICANT CHANGE UP (ref 27–34)
MCHC RBC-ENTMCNC: 27.9 PG — SIGNIFICANT CHANGE UP (ref 27–34)
MCHC RBC-ENTMCNC: 28 PG — SIGNIFICANT CHANGE UP (ref 27–34)
MCHC RBC-ENTMCNC: 28 PG — SIGNIFICANT CHANGE UP (ref 27–34)
MCHC RBC-ENTMCNC: 28.1 PG — SIGNIFICANT CHANGE UP (ref 27–34)
MCHC RBC-ENTMCNC: 28.2 PG — SIGNIFICANT CHANGE UP (ref 27–34)
MCHC RBC-ENTMCNC: 28.2 PG — SIGNIFICANT CHANGE UP (ref 27–34)
MCHC RBC-ENTMCNC: 28.3 PG — SIGNIFICANT CHANGE UP (ref 27–34)
MCHC RBC-ENTMCNC: 28.4 PG — SIGNIFICANT CHANGE UP (ref 27–34)
MCHC RBC-ENTMCNC: 28.4 PG — SIGNIFICANT CHANGE UP (ref 27–34)
MCHC RBC-ENTMCNC: 29.3 PG
MCHC RBC-ENTMCNC: 30 G/DL — LOW (ref 32–36)
MCHC RBC-ENTMCNC: 30.1 G/DL — LOW (ref 32–36)
MCHC RBC-ENTMCNC: 30.1 G/DL — LOW (ref 32–36)
MCHC RBC-ENTMCNC: 30.2 GM/DL
MCHC RBC-ENTMCNC: 30.4 G/DL — LOW (ref 32–36)
MCHC RBC-ENTMCNC: 30.6 G/DL — LOW (ref 32–36)
MCHC RBC-ENTMCNC: 30.6 G/DL — LOW (ref 32–36)
MCHC RBC-ENTMCNC: 30.8 G/DL — LOW (ref 32–36)
MCHC RBC-ENTMCNC: 30.8 G/DL — LOW (ref 32–36)
MCHC RBC-ENTMCNC: 30.9 G/DL — LOW (ref 32–36)
MCHC RBC-ENTMCNC: 31 G/DL — LOW (ref 32–36)
MCHC RBC-ENTMCNC: 31 G/DL — LOW (ref 32–36)
MCHC RBC-ENTMCNC: 31.1 G/DL — LOW (ref 32–36)
MCHC RBC-ENTMCNC: 31.1 G/DL — LOW (ref 32–36)
MCHC RBC-ENTMCNC: 31.3 G/DL — LOW (ref 32–36)
MCHC RBC-ENTMCNC: 31.5 G/DL — LOW (ref 32–36)
MCHC RBC-ENTMCNC: 31.6 G/DL — LOW (ref 32–36)
MCHC RBC-ENTMCNC: 31.6 G/DL — LOW (ref 32–36)
MCHC RBC-ENTMCNC: 31.8 G/DL — LOW (ref 32–36)
MCHC RBC-ENTMCNC: 31.9 G/DL — LOW (ref 32–36)
MCHC RBC-ENTMCNC: 32.2 G/DL — SIGNIFICANT CHANGE UP (ref 32–36)
MCHC RBC-ENTMCNC: 32.7 G/DL — SIGNIFICANT CHANGE UP (ref 32–36)
MCV RBC AUTO: 84.9 FL — SIGNIFICANT CHANGE UP (ref 80–100)
MCV RBC AUTO: 86 FL — SIGNIFICANT CHANGE UP (ref 80–100)
MCV RBC AUTO: 86.1 FL — SIGNIFICANT CHANGE UP (ref 80–100)
MCV RBC AUTO: 86.4 FL — SIGNIFICANT CHANGE UP (ref 80–100)
MCV RBC AUTO: 86.7 FL — SIGNIFICANT CHANGE UP (ref 80–100)
MCV RBC AUTO: 87 FL — SIGNIFICANT CHANGE UP (ref 80–100)
MCV RBC AUTO: 88.1 FL — SIGNIFICANT CHANGE UP (ref 80–100)
MCV RBC AUTO: 88.6 FL — SIGNIFICANT CHANGE UP (ref 80–100)
MCV RBC AUTO: 88.8 FL — SIGNIFICANT CHANGE UP (ref 80–100)
MCV RBC AUTO: 89.6 FL — SIGNIFICANT CHANGE UP (ref 80–100)
MCV RBC AUTO: 89.9 FL — SIGNIFICANT CHANGE UP (ref 80–100)
MCV RBC AUTO: 90.3 FL — SIGNIFICANT CHANGE UP (ref 80–100)
MCV RBC AUTO: 90.5 FL — SIGNIFICANT CHANGE UP (ref 80–100)
MCV RBC AUTO: 90.6 FL — SIGNIFICANT CHANGE UP (ref 80–100)
MCV RBC AUTO: 90.7 FL — SIGNIFICANT CHANGE UP (ref 80–100)
MCV RBC AUTO: 91 FL — SIGNIFICANT CHANGE UP (ref 80–100)
MCV RBC AUTO: 91 FL — SIGNIFICANT CHANGE UP (ref 80–100)
MCV RBC AUTO: 91.2 FL — SIGNIFICANT CHANGE UP (ref 80–100)
MCV RBC AUTO: 91.5 FL — SIGNIFICANT CHANGE UP (ref 80–100)
MCV RBC AUTO: 91.8 FL — SIGNIFICANT CHANGE UP (ref 80–100)
MCV RBC AUTO: 91.9 FL — SIGNIFICANT CHANGE UP (ref 80–100)
MCV RBC AUTO: 92 FL — SIGNIFICANT CHANGE UP (ref 80–100)
MCV RBC AUTO: 92.3 FL — SIGNIFICANT CHANGE UP (ref 80–100)
MCV RBC AUTO: 96.9 FL
METHOD TYPE: SIGNIFICANT CHANGE UP
MICROCYTES BLD QL: SLIGHT — SIGNIFICANT CHANGE UP
MICROCYTES BLD QL: SLIGHT — SIGNIFICANT CHANGE UP
MONOCYTES # BLD AUTO: 0.05 K/UL — SIGNIFICANT CHANGE UP (ref 0–0.9)
MONOCYTES # BLD AUTO: 0.4 K/UL — SIGNIFICANT CHANGE UP (ref 0–0.9)
MONOCYTES # BLD AUTO: 0.4 K/UL — SIGNIFICANT CHANGE UP (ref 0–0.9)
MONOCYTES # BLD AUTO: 0.45 K/UL — SIGNIFICANT CHANGE UP (ref 0–0.9)
MONOCYTES # BLD AUTO: 0.46 K/UL — SIGNIFICANT CHANGE UP (ref 0–0.9)
MONOCYTES # BLD AUTO: 0.52 K/UL
MONOCYTES # BLD AUTO: 0.57 K/UL — SIGNIFICANT CHANGE UP (ref 0–0.9)
MONOCYTES # BLD AUTO: 0.69 K/UL — SIGNIFICANT CHANGE UP (ref 0–0.9)
MONOCYTES # BLD AUTO: 0.81 K/UL — SIGNIFICANT CHANGE UP (ref 0–0.9)
MONOCYTES NFR BLD AUTO: 1.7 % — LOW (ref 2–14)
MONOCYTES NFR BLD AUTO: 4 % — SIGNIFICANT CHANGE UP (ref 2–14)
MONOCYTES NFR BLD AUTO: 4.1 % — SIGNIFICANT CHANGE UP (ref 2–14)
MONOCYTES NFR BLD AUTO: 5 % — SIGNIFICANT CHANGE UP (ref 2–14)
MONOCYTES NFR BLD AUTO: 5.4 % — SIGNIFICANT CHANGE UP (ref 2–14)
MONOCYTES NFR BLD AUTO: 6.9 %
MONOCYTES NFR BLD AUTO: 7.1 % — SIGNIFICANT CHANGE UP (ref 2–14)
MONOCYTES NFR BLD AUTO: 7.1 % — SIGNIFICANT CHANGE UP (ref 2–14)
MONOCYTES NFR BLD AUTO: 8.8 % — SIGNIFICANT CHANGE UP (ref 2–14)
MRSA PCR RESULT.: SIGNIFICANT CHANGE UP
MRSA PCR RESULT.: SIGNIFICANT CHANGE UP
NEUTROPHILS # BLD AUTO: 12.64 K/UL — HIGH (ref 1.8–7.4)
NEUTROPHILS # BLD AUTO: 12.8 K/UL — HIGH (ref 1.8–7.4)
NEUTROPHILS # BLD AUTO: 16.46 K/UL — HIGH (ref 1.8–7.4)
NEUTROPHILS # BLD AUTO: 2.46 K/UL — SIGNIFICANT CHANGE UP (ref 1.8–7.4)
NEUTROPHILS # BLD AUTO: 4.39 K/UL — SIGNIFICANT CHANGE UP (ref 1.8–7.4)
NEUTROPHILS # BLD AUTO: 4.97 K/UL — SIGNIFICANT CHANGE UP (ref 1.8–7.4)
NEUTROPHILS # BLD AUTO: 5.07 K/UL — SIGNIFICANT CHANGE UP (ref 1.8–7.4)
NEUTROPHILS # BLD AUTO: 5.85 K/UL
NEUTROPHILS # BLD AUTO: 7.38 K/UL — SIGNIFICANT CHANGE UP (ref 1.8–7.4)
NEUTROPHILS NFR BLD AUTO: 77.3 %
NEUTROPHILS NFR BLD AUTO: 79.9 % — HIGH (ref 43–77)
NEUTROPHILS NFR BLD AUTO: 80 % — HIGH (ref 43–77)
NEUTROPHILS NFR BLD AUTO: 83.8 % — HIGH (ref 43–77)
NEUTROPHILS NFR BLD AUTO: 84 % — HIGH (ref 43–77)
NEUTROPHILS NFR BLD AUTO: 85.8 % — HIGH (ref 43–77)
NEUTROPHILS NFR BLD AUTO: 87.9 % — HIGH (ref 43–77)
NEUTROPHILS NFR BLD AUTO: 91 % — HIGH (ref 43–77)
NEUTROPHILS NFR BLD AUTO: 91.1 % — HIGH (ref 43–77)
NEUTS BAND # BLD: 15 % — HIGH (ref 0–8)
NEUTS BAND # BLD: 2 % — SIGNIFICANT CHANGE UP (ref 0–8)
NITRITE UR-MCNC: NEGATIVE — SIGNIFICANT CHANGE UP
NONHDLC SERPL-MCNC: 131 MG/DL
NRBC # BLD: 0 /100 WBCS — SIGNIFICANT CHANGE UP (ref 0–0)
NRBC # BLD: 0 /100 — SIGNIFICANT CHANGE UP (ref 0–0)
NRBC # BLD: 0 /100 — SIGNIFICANT CHANGE UP (ref 0–0)
NRBC # BLD: SIGNIFICANT CHANGE UP /100 WBCS (ref 0–0)
NRBC # BLD: SIGNIFICANT CHANGE UP /100 WBCS (ref 0–0)
NT-PROBNP SERPL-SCNC: 610 PG/ML — HIGH (ref 0–450)
ORGANISM # SPEC MICROSCOPIC CNT: SIGNIFICANT CHANGE UP
ORGANISM # SPEC MICROSCOPIC CNT: SIGNIFICANT CHANGE UP
OVALOCYTES BLD QL SMEAR: SLIGHT — SIGNIFICANT CHANGE UP
PCO2 BLDA: 34 MMHG — SIGNIFICANT CHANGE UP (ref 32–46)
PCO2 BLDA: 38 MMHG — SIGNIFICANT CHANGE UP (ref 32–46)
PCO2 BLDA: 46 MMHG — SIGNIFICANT CHANGE UP (ref 32–46)
PCO2 BLDA: 72 MMHG — CRITICAL HIGH (ref 32–46)
PCO2 BLDA: 82 MMHG — CRITICAL HIGH (ref 32–46)
PCO2 BLDA: 94 MMHG — CRITICAL HIGH (ref 32–46)
PCO2 BLDA: 94 MMHG — CRITICAL HIGH (ref 32–46)
PH BLDA: 7.06 — CRITICAL LOW (ref 7.35–7.45)
PH BLDA: 7.11 — CRITICAL LOW (ref 7.35–7.45)
PH BLDA: 7.11 — CRITICAL LOW (ref 7.35–7.45)
PH BLDA: 7.13 — CRITICAL LOW (ref 7.35–7.45)
PH BLDA: 7.42 — SIGNIFICANT CHANGE UP (ref 7.35–7.45)
PH BLDA: 7.45 — SIGNIFICANT CHANGE UP (ref 7.35–7.45)
PH BLDA: 7.47 — HIGH (ref 7.35–7.45)
PH UR: 6 — SIGNIFICANT CHANGE UP (ref 5–8)
PHOSPHATE SERPL-MCNC: 1.9 MG/DL — LOW (ref 2.5–4.5)
PHOSPHATE SERPL-MCNC: 2.3 MG/DL — LOW (ref 2.5–4.5)
PHOSPHATE SERPL-MCNC: 2.3 MG/DL — LOW (ref 2.5–4.5)
PHOSPHATE SERPL-MCNC: 2.6 MG/DL — SIGNIFICANT CHANGE UP (ref 2.5–4.5)
PHOSPHATE SERPL-MCNC: 2.8 MG/DL — SIGNIFICANT CHANGE UP (ref 2.5–4.5)
PHOSPHATE SERPL-MCNC: 2.8 MG/DL — SIGNIFICANT CHANGE UP (ref 2.5–4.5)
PHOSPHATE SERPL-MCNC: 2.9 MG/DL — SIGNIFICANT CHANGE UP (ref 2.5–4.5)
PHOSPHATE SERPL-MCNC: 3.1 MG/DL — SIGNIFICANT CHANGE UP (ref 2.5–4.5)
PHOSPHATE SERPL-MCNC: 3.1 MG/DL — SIGNIFICANT CHANGE UP (ref 2.5–4.5)
PHOSPHATE SERPL-MCNC: 5.8 MG/DL — HIGH (ref 2.5–4.5)
PLAT MORPH BLD: NORMAL — SIGNIFICANT CHANGE UP
PLAT MORPH BLD: NORMAL — SIGNIFICANT CHANGE UP
PLATELET # BLD AUTO: 178 K/UL — SIGNIFICANT CHANGE UP (ref 150–400)
PLATELET # BLD AUTO: 181 K/UL — SIGNIFICANT CHANGE UP (ref 150–400)
PLATELET # BLD AUTO: 190 K/UL
PLATELET # BLD AUTO: 191 K/UL — SIGNIFICANT CHANGE UP (ref 150–400)
PLATELET # BLD AUTO: 192 K/UL — SIGNIFICANT CHANGE UP (ref 150–400)
PLATELET # BLD AUTO: 192 K/UL — SIGNIFICANT CHANGE UP (ref 150–400)
PLATELET # BLD AUTO: 193 K/UL — SIGNIFICANT CHANGE UP (ref 150–400)
PLATELET # BLD AUTO: 193 K/UL — SIGNIFICANT CHANGE UP (ref 150–400)
PLATELET # BLD AUTO: 194 K/UL — SIGNIFICANT CHANGE UP (ref 150–400)
PLATELET # BLD AUTO: 197 K/UL — SIGNIFICANT CHANGE UP (ref 150–400)
PLATELET # BLD AUTO: 199 K/UL — SIGNIFICANT CHANGE UP (ref 150–400)
PLATELET # BLD AUTO: 203 K/UL — SIGNIFICANT CHANGE UP (ref 150–400)
PLATELET # BLD AUTO: 203 K/UL — SIGNIFICANT CHANGE UP (ref 150–400)
PLATELET # BLD AUTO: 204 K/UL — SIGNIFICANT CHANGE UP (ref 150–400)
PLATELET # BLD AUTO: 205 K/UL — SIGNIFICANT CHANGE UP (ref 150–400)
PLATELET # BLD AUTO: 206 K/UL — SIGNIFICANT CHANGE UP (ref 150–400)
PLATELET # BLD AUTO: 208 K/UL — SIGNIFICANT CHANGE UP (ref 150–400)
PLATELET # BLD AUTO: 224 K/UL — SIGNIFICANT CHANGE UP (ref 150–400)
PLATELET # BLD AUTO: 224 K/UL — SIGNIFICANT CHANGE UP (ref 150–400)
PLATELET # BLD AUTO: 228 K/UL — SIGNIFICANT CHANGE UP (ref 150–400)
PLATELET # BLD AUTO: 233 K/UL — SIGNIFICANT CHANGE UP (ref 150–400)
PLATELET # BLD AUTO: 236 K/UL — SIGNIFICANT CHANGE UP (ref 150–400)
PLATELET # BLD AUTO: 236 K/UL — SIGNIFICANT CHANGE UP (ref 150–400)
PLATELET # BLD AUTO: 348 K/UL — SIGNIFICANT CHANGE UP (ref 150–400)
PO2 BLDA: 56 MMHG — LOW (ref 83–108)
PO2 BLDA: 71 MMHG — LOW (ref 83–108)
PO2 BLDA: 73 MMHG — LOW (ref 83–108)
PO2 BLDA: 89 MMHG — SIGNIFICANT CHANGE UP (ref 83–108)
PO2 BLDA: 93 MMHG — SIGNIFICANT CHANGE UP (ref 83–108)
PO2 BLDA: 95 MMHG — SIGNIFICANT CHANGE UP (ref 83–108)
PO2 BLDA: 99 MMHG — SIGNIFICANT CHANGE UP (ref 83–108)
POIKILOCYTOSIS BLD QL AUTO: SIGNIFICANT CHANGE UP
POIKILOCYTOSIS BLD QL AUTO: SLIGHT — SIGNIFICANT CHANGE UP
POLYCHROMASIA BLD QL SMEAR: SLIGHT — SIGNIFICANT CHANGE UP
POLYCHROMASIA BLD QL SMEAR: SLIGHT — SIGNIFICANT CHANGE UP
POTASSIUM SERPL-MCNC: 3.2 MMOL/L — LOW (ref 3.5–5.3)
POTASSIUM SERPL-MCNC: 3.4 MMOL/L — LOW (ref 3.5–5.3)
POTASSIUM SERPL-MCNC: 3.5 MMOL/L — SIGNIFICANT CHANGE UP (ref 3.5–5.3)
POTASSIUM SERPL-MCNC: 3.6 MMOL/L — SIGNIFICANT CHANGE UP (ref 3.5–5.3)
POTASSIUM SERPL-MCNC: 3.7 MMOL/L — SIGNIFICANT CHANGE UP (ref 3.5–5.3)
POTASSIUM SERPL-MCNC: 3.7 MMOL/L — SIGNIFICANT CHANGE UP (ref 3.5–5.3)
POTASSIUM SERPL-MCNC: 3.8 MMOL/L — SIGNIFICANT CHANGE UP (ref 3.5–5.3)
POTASSIUM SERPL-MCNC: 3.9 MMOL/L — SIGNIFICANT CHANGE UP (ref 3.5–5.3)
POTASSIUM SERPL-MCNC: 4 MMOL/L — SIGNIFICANT CHANGE UP (ref 3.5–5.3)
POTASSIUM SERPL-MCNC: 4.1 MMOL/L — SIGNIFICANT CHANGE UP (ref 3.5–5.3)
POTASSIUM SERPL-MCNC: 4.3 MMOL/L — SIGNIFICANT CHANGE UP (ref 3.5–5.3)
POTASSIUM SERPL-SCNC: 3.2 MMOL/L — LOW (ref 3.5–5.3)
POTASSIUM SERPL-SCNC: 3.4 MMOL/L — LOW (ref 3.5–5.3)
POTASSIUM SERPL-SCNC: 3.5 MMOL/L — SIGNIFICANT CHANGE UP (ref 3.5–5.3)
POTASSIUM SERPL-SCNC: 3.6 MMOL/L — SIGNIFICANT CHANGE UP (ref 3.5–5.3)
POTASSIUM SERPL-SCNC: 3.7 MMOL/L — SIGNIFICANT CHANGE UP (ref 3.5–5.3)
POTASSIUM SERPL-SCNC: 3.7 MMOL/L — SIGNIFICANT CHANGE UP (ref 3.5–5.3)
POTASSIUM SERPL-SCNC: 3.8 MMOL/L — SIGNIFICANT CHANGE UP (ref 3.5–5.3)
POTASSIUM SERPL-SCNC: 3.9 MMOL/L — SIGNIFICANT CHANGE UP (ref 3.5–5.3)
POTASSIUM SERPL-SCNC: 4 MMOL/L — SIGNIFICANT CHANGE UP (ref 3.5–5.3)
POTASSIUM SERPL-SCNC: 4.1 MMOL/L — SIGNIFICANT CHANGE UP (ref 3.5–5.3)
POTASSIUM SERPL-SCNC: 4.3 MMOL/L — SIGNIFICANT CHANGE UP (ref 3.5–5.3)
POTASSIUM SERPL-SCNC: 4.7 MMOL/L
PROCALCITONIN SERPL-MCNC: 0.61 NG/ML — HIGH (ref 0.02–0.1)
PROT SERPL-MCNC: 4.9 GM/DL — LOW (ref 6–8.3)
PROT SERPL-MCNC: 5 GM/DL — LOW (ref 6–8.3)
PROT SERPL-MCNC: 5.1 GM/DL — LOW (ref 6–8.3)
PROT SERPL-MCNC: 5.5 GM/DL — LOW (ref 6–8.3)
PROT SERPL-MCNC: 5.6 GM/DL — LOW (ref 6–8.3)
PROT SERPL-MCNC: 5.7 GM/DL — LOW (ref 6–8.3)
PROT SERPL-MCNC: 5.7 GM/DL — LOW (ref 6–8.3)
PROT SERPL-MCNC: 5.8 GM/DL — LOW (ref 6–8.3)
PROT SERPL-MCNC: 5.9 GM/DL — LOW (ref 6–8.3)
PROT SERPL-MCNC: 6.3 G/DL
PROT SERPL-MCNC: 6.7 GM/DL — SIGNIFICANT CHANGE UP (ref 6–8.3)
PROT UR-MCNC: NEGATIVE MG/DL — SIGNIFICANT CHANGE UP
PROTHROM AB SERPL-ACNC: 11.3 SEC — SIGNIFICANT CHANGE UP (ref 10.5–13.4)
PROTHROM AB SERPL-ACNC: 12.5 SEC — SIGNIFICANT CHANGE UP (ref 10.5–13.4)
PROTHROM AB SERPL-ACNC: 13.5 SEC — HIGH (ref 10.5–13.4)
PSA SERPL-MCNC: 1.14 NG/ML
RAPID RVP RESULT: DETECTED
RBC # BLD: 2.22 M/UL — LOW (ref 4.2–5.8)
RBC # BLD: 2.25 M/UL — LOW (ref 4.2–5.8)
RBC # BLD: 2.52 M/UL — LOW (ref 4.2–5.8)
RBC # BLD: 2.59 M/UL — LOW (ref 4.2–5.8)
RBC # BLD: 2.59 M/UL — LOW (ref 4.2–5.8)
RBC # BLD: 2.64 M/UL — LOW (ref 4.2–5.8)
RBC # BLD: 2.67 M/UL — LOW (ref 4.2–5.8)
RBC # BLD: 2.69 M/UL — LOW (ref 4.2–5.8)
RBC # BLD: 2.79 M/UL — LOW (ref 4.2–5.8)
RBC # BLD: 2.83 M/UL — LOW (ref 4.2–5.8)
RBC # BLD: 2.96 M/UL — LOW (ref 4.2–5.8)
RBC # BLD: 2.96 M/UL — LOW (ref 4.2–5.8)
RBC # BLD: 3 M/UL — LOW (ref 4.2–5.8)
RBC # BLD: 3.01 M/UL — LOW (ref 4.2–5.8)
RBC # BLD: 3.07 M/UL — LOW (ref 4.2–5.8)
RBC # BLD: 3.1 M/UL — LOW (ref 4.2–5.8)
RBC # BLD: 3.29 M/UL — LOW (ref 4.2–5.8)
RBC # BLD: 3.34 M/UL — LOW (ref 4.2–5.8)
RBC # BLD: 3.35 M/UL — LOW (ref 4.2–5.8)
RBC # BLD: 3.37 M/UL — LOW (ref 4.2–5.8)
RBC # BLD: 3.38 M/UL — LOW (ref 4.2–5.8)
RBC # BLD: 3.45 M/UL — LOW (ref 4.2–5.8)
RBC # BLD: 3.63 M/UL — LOW (ref 4.2–5.8)
RBC # BLD: 3.89 M/UL
RBC # FLD: 15.5 % — HIGH (ref 10.3–14.5)
RBC # FLD: 15.5 % — HIGH (ref 10.3–14.5)
RBC # FLD: 15.6 % — HIGH (ref 10.3–14.5)
RBC # FLD: 15.7 % — HIGH (ref 10.3–14.5)
RBC # FLD: 15.9 %
RBC # FLD: 15.9 % — HIGH (ref 10.3–14.5)
RBC # FLD: 16 % — HIGH (ref 10.3–14.5)
RBC # FLD: 16 % — HIGH (ref 10.3–14.5)
RBC # FLD: 16.1 % — HIGH (ref 10.3–14.5)
RBC # FLD: 17.6 % — HIGH (ref 10.3–14.5)
RBC # FLD: 17.7 % — HIGH (ref 10.3–14.5)
RBC # FLD: 17.7 % — HIGH (ref 10.3–14.5)
RBC # FLD: 17.8 % — HIGH (ref 10.3–14.5)
RBC # FLD: 17.9 % — HIGH (ref 10.3–14.5)
RBC # FLD: 17.9 % — HIGH (ref 10.3–14.5)
RBC # FLD: 18 % — HIGH (ref 10.3–14.5)
RBC # FLD: 18.6 % — HIGH (ref 10.3–14.5)
RBC # FLD: 19 % — HIGH (ref 10.3–14.5)
RBC # FLD: 19.1 % — HIGH (ref 10.3–14.5)
RBC # FLD: 19.1 % — HIGH (ref 10.3–14.5)
RBC # FLD: 19.2 % — HIGH (ref 10.3–14.5)
RBC # FLD: 19.4 % — HIGH (ref 10.3–14.5)
RBC BLD AUTO: ABNORMAL
RBC BLD AUTO: ABNORMAL
S AUREUS DNA NOSE QL NAA+PROBE: SIGNIFICANT CHANGE UP
S AUREUS DNA NOSE QL NAA+PROBE: SIGNIFICANT CHANGE UP
SAO2 % BLDA: 89 % — LOW (ref 94–98)
SAO2 % BLDA: 95.5 % — SIGNIFICANT CHANGE UP (ref 94–98)
SAO2 % BLDA: 96.6 % — SIGNIFICANT CHANGE UP (ref 94–98)
SAO2 % BLDA: 97 % — SIGNIFICANT CHANGE UP (ref 94–98)
SAO2 % BLDA: 97.1 % — SIGNIFICANT CHANGE UP (ref 94–98)
SAO2 % BLDA: 98.1 % — HIGH (ref 94–98)
SAO2 % BLDA: 98.8 % — HIGH (ref 94–98)
SARS-COV-2 RNA SPEC QL NAA+PROBE: DETECTED
SCHISTOCYTES BLD QL AUTO: SLIGHT — SIGNIFICANT CHANGE UP
SMUDGE CELLS # BLD: PRESENT — SIGNIFICANT CHANGE UP
SODIUM SERPL-SCNC: 141 MMOL/L
SODIUM SERPL-SCNC: 142 MMOL/L — SIGNIFICANT CHANGE UP (ref 135–145)
SODIUM SERPL-SCNC: 142 MMOL/L — SIGNIFICANT CHANGE UP (ref 135–145)
SODIUM SERPL-SCNC: 143 MMOL/L — SIGNIFICANT CHANGE UP (ref 135–145)
SODIUM SERPL-SCNC: 144 MMOL/L — SIGNIFICANT CHANGE UP (ref 135–145)
SODIUM SERPL-SCNC: 145 MMOL/L — SIGNIFICANT CHANGE UP (ref 135–145)
SODIUM SERPL-SCNC: 146 MMOL/L — HIGH (ref 135–145)
SODIUM SERPL-SCNC: 146 MMOL/L — HIGH (ref 135–145)
SODIUM SERPL-SCNC: 147 MMOL/L — HIGH (ref 135–145)
SODIUM SERPL-SCNC: 149 MMOL/L — HIGH (ref 135–145)
SODIUM SERPL-SCNC: 150 MMOL/L — HIGH (ref 135–145)
SODIUM SERPL-SCNC: 152 MMOL/L — HIGH (ref 135–145)
SP GR SPEC: 1 — LOW (ref 1.01–1.02)
SPECIMEN SOURCE: SIGNIFICANT CHANGE UP
TOXIC GRANULES BLD QL SMEAR: PRESENT — SIGNIFICANT CHANGE UP
TRIGL SERPL-MCNC: 103 MG/DL
TROPONIN I, HIGH SENSITIVITY RESULT: 9 NG/L — SIGNIFICANT CHANGE UP
TSH SERPL-ACNC: 1.6 UIU/ML
UROBILINOGEN FLD QL: NEGATIVE MG/DL — SIGNIFICANT CHANGE UP
WBC # BLD: 10.11 K/UL — SIGNIFICANT CHANGE UP (ref 3.8–10.5)
WBC # BLD: 10.72 K/UL — HIGH (ref 3.8–10.5)
WBC # BLD: 13.88 K/UL — HIGH (ref 3.8–10.5)
WBC # BLD: 14.92 K/UL — HIGH (ref 3.8–10.5)
WBC # BLD: 17.33 K/UL — HIGH (ref 3.8–10.5)
WBC # BLD: 2.93 K/UL — LOW (ref 3.8–10.5)
WBC # BLD: 4.52 K/UL — SIGNIFICANT CHANGE UP (ref 3.8–10.5)
WBC # BLD: 4.75 K/UL — SIGNIFICANT CHANGE UP (ref 3.8–10.5)
WBC # BLD: 4.77 K/UL — SIGNIFICANT CHANGE UP (ref 3.8–10.5)
WBC # BLD: 5.09 K/UL — SIGNIFICANT CHANGE UP (ref 3.8–10.5)
WBC # BLD: 5.24 K/UL — SIGNIFICANT CHANGE UP (ref 3.8–10.5)
WBC # BLD: 5.41 K/UL — SIGNIFICANT CHANGE UP (ref 3.8–10.5)
WBC # BLD: 5.65 K/UL — SIGNIFICANT CHANGE UP (ref 3.8–10.5)
WBC # BLD: 6.35 K/UL — SIGNIFICANT CHANGE UP (ref 3.8–10.5)
WBC # BLD: 6.62 K/UL — SIGNIFICANT CHANGE UP (ref 3.8–10.5)
WBC # BLD: 6.63 K/UL — SIGNIFICANT CHANGE UP (ref 3.8–10.5)
WBC # BLD: 7.36 K/UL — SIGNIFICANT CHANGE UP (ref 3.8–10.5)
WBC # BLD: 7.64 K/UL — SIGNIFICANT CHANGE UP (ref 3.8–10.5)
WBC # BLD: 7.82 K/UL — SIGNIFICANT CHANGE UP (ref 3.8–10.5)
WBC # BLD: 7.94 K/UL — SIGNIFICANT CHANGE UP (ref 3.8–10.5)
WBC # BLD: 8.68 K/UL — SIGNIFICANT CHANGE UP (ref 3.8–10.5)
WBC # BLD: 8.98 K/UL — SIGNIFICANT CHANGE UP (ref 3.8–10.5)
WBC # BLD: 9.9 K/UL — SIGNIFICANT CHANGE UP (ref 3.8–10.5)
WBC # FLD AUTO: 10.11 K/UL — SIGNIFICANT CHANGE UP (ref 3.8–10.5)
WBC # FLD AUTO: 10.72 K/UL — HIGH (ref 3.8–10.5)
WBC # FLD AUTO: 13.88 K/UL — HIGH (ref 3.8–10.5)
WBC # FLD AUTO: 14.92 K/UL — HIGH (ref 3.8–10.5)
WBC # FLD AUTO: 17.33 K/UL — HIGH (ref 3.8–10.5)
WBC # FLD AUTO: 2.93 K/UL — LOW (ref 3.8–10.5)
WBC # FLD AUTO: 4.52 K/UL — SIGNIFICANT CHANGE UP (ref 3.8–10.5)
WBC # FLD AUTO: 4.75 K/UL — SIGNIFICANT CHANGE UP (ref 3.8–10.5)
WBC # FLD AUTO: 4.77 K/UL — SIGNIFICANT CHANGE UP (ref 3.8–10.5)
WBC # FLD AUTO: 5.09 K/UL — SIGNIFICANT CHANGE UP (ref 3.8–10.5)
WBC # FLD AUTO: 5.24 K/UL — SIGNIFICANT CHANGE UP (ref 3.8–10.5)
WBC # FLD AUTO: 5.41 K/UL — SIGNIFICANT CHANGE UP (ref 3.8–10.5)
WBC # FLD AUTO: 5.65 K/UL — SIGNIFICANT CHANGE UP (ref 3.8–10.5)
WBC # FLD AUTO: 6.35 K/UL — SIGNIFICANT CHANGE UP (ref 3.8–10.5)
WBC # FLD AUTO: 6.62 K/UL — SIGNIFICANT CHANGE UP (ref 3.8–10.5)
WBC # FLD AUTO: 6.63 K/UL — SIGNIFICANT CHANGE UP (ref 3.8–10.5)
WBC # FLD AUTO: 7.36 K/UL — SIGNIFICANT CHANGE UP (ref 3.8–10.5)
WBC # FLD AUTO: 7.57 K/UL
WBC # FLD AUTO: 7.64 K/UL — SIGNIFICANT CHANGE UP (ref 3.8–10.5)
WBC # FLD AUTO: 7.82 K/UL — SIGNIFICANT CHANGE UP (ref 3.8–10.5)
WBC # FLD AUTO: 7.94 K/UL — SIGNIFICANT CHANGE UP (ref 3.8–10.5)
WBC # FLD AUTO: 8.68 K/UL — SIGNIFICANT CHANGE UP (ref 3.8–10.5)
WBC # FLD AUTO: 8.98 K/UL — SIGNIFICANT CHANGE UP (ref 3.8–10.5)
WBC # FLD AUTO: 9.9 K/UL — SIGNIFICANT CHANGE UP (ref 3.8–10.5)

## 2022-01-01 PROCEDURE — 99205 OFFICE O/P NEW HI 60 MIN: CPT

## 2022-01-01 PROCEDURE — 76775 US EXAM ABDO BACK WALL LIM: CPT

## 2022-01-01 PROCEDURE — 43753 TX GASTRO INTUB W/ASP: CPT

## 2022-01-01 PROCEDURE — 71250 CT THORAX DX C-: CPT | Mod: 26

## 2022-01-01 PROCEDURE — 99291 CRITICAL CARE FIRST HOUR: CPT

## 2022-01-01 PROCEDURE — 71045 X-RAY EXAM CHEST 1 VIEW: CPT | Mod: 26

## 2022-01-01 PROCEDURE — 99233 SBSQ HOSP IP/OBS HIGH 50: CPT

## 2022-01-01 PROCEDURE — 99232 SBSQ HOSP IP/OBS MODERATE 35: CPT

## 2022-01-01 PROCEDURE — 76775 US EXAM ABDO BACK WALL LIM: CPT | Mod: 26

## 2022-01-01 PROCEDURE — 99239 HOSP IP/OBS DSCHRG MGMT >30: CPT

## 2022-01-01 PROCEDURE — 99291 CRITICAL CARE FIRST HOUR: CPT | Mod: 25

## 2022-01-01 PROCEDURE — 71045 X-RAY EXAM CHEST 1 VIEW: CPT | Mod: 26,76

## 2022-01-01 PROCEDURE — 74174 CTA ABD&PLVS W/CONTRAST: CPT | Mod: 26

## 2022-01-01 PROCEDURE — 36620 INSERTION CATHETER ARTERY: CPT

## 2022-01-01 PROCEDURE — 31500 INSERT EMERGENCY AIRWAY: CPT

## 2022-01-01 PROCEDURE — 36556 INSERT NON-TUNNEL CV CATH: CPT

## 2022-01-01 PROCEDURE — 99204 OFFICE O/P NEW MOD 45 MIN: CPT

## 2022-01-01 PROCEDURE — 74174 CTA ABD&PLVS W/CONTRAST: CPT

## 2022-01-01 PROCEDURE — 93308 TTE F-UP OR LMTD: CPT | Mod: 26

## 2022-01-01 PROCEDURE — 93971 EXTREMITY STUDY: CPT | Mod: 26,RT

## 2022-01-01 PROCEDURE — 99285 EMERGENCY DEPT VISIT HI MDM: CPT

## 2022-01-01 PROCEDURE — 99223 1ST HOSP IP/OBS HIGH 75: CPT

## 2022-01-01 PROCEDURE — 93306 TTE W/DOPPLER COMPLETE: CPT | Mod: 26

## 2022-01-01 PROCEDURE — 99202 OFFICE O/P NEW SF 15 MIN: CPT

## 2022-01-01 PROCEDURE — 93000 ELECTROCARDIOGRAM COMPLETE: CPT

## 2022-01-01 PROCEDURE — 99053 MED SERV 10PM-8AM 24 HR FAC: CPT

## 2022-01-01 PROCEDURE — 93978 VASCULAR STUDY: CPT

## 2022-01-01 PROCEDURE — 36415 COLL VENOUS BLD VENIPUNCTURE: CPT

## 2022-01-01 PROCEDURE — 93970 EXTREMITY STUDY: CPT | Mod: 26

## 2022-01-01 PROCEDURE — 71275 CT ANGIOGRAPHY CHEST: CPT | Mod: 26,MA

## 2022-01-01 PROCEDURE — 99292 CRITICAL CARE ADDL 30 MIN: CPT | Mod: 25

## 2022-01-01 PROCEDURE — 99213 OFFICE O/P EST LOW 20 MIN: CPT

## 2022-01-01 PROCEDURE — 93010 ELECTROCARDIOGRAM REPORT: CPT

## 2022-01-01 PROCEDURE — 76604 US EXAM CHEST: CPT | Mod: 26

## 2022-01-01 PROCEDURE — G0438: CPT

## 2022-01-01 PROCEDURE — 99213 OFFICE O/P EST LOW 20 MIN: CPT | Mod: 95

## 2022-01-01 RX ORDER — FUROSEMIDE 40 MG
20 TABLET ORAL ONCE
Refills: 0 | Status: COMPLETED | OUTPATIENT
Start: 2022-01-01 | End: 2022-01-01

## 2022-01-01 RX ORDER — MORPHINE SULFATE 50 MG/1
2 CAPSULE, EXTENDED RELEASE ORAL EVERY 6 HOURS
Refills: 0 | Status: DISCONTINUED | OUTPATIENT
Start: 2022-01-01 | End: 2022-01-01

## 2022-01-01 RX ORDER — NOREPINEPHRINE BITARTRATE/D5W 8 MG/250ML
0.05 PLASTIC BAG, INJECTION (ML) INTRAVENOUS
Qty: 32 | Refills: 0 | Status: DISCONTINUED | OUTPATIENT
Start: 2022-01-01 | End: 2022-01-01

## 2022-01-01 RX ORDER — NOREPINEPHRINE BITARTRATE/D5W 8 MG/250ML
0.05 PLASTIC BAG, INJECTION (ML) INTRAVENOUS
Qty: 8 | Refills: 0 | Status: DISCONTINUED | OUTPATIENT
Start: 2022-01-01 | End: 2022-01-01

## 2022-01-01 RX ORDER — BENZONATATE 100 MG/1
100 CAPSULE ORAL 3 TIMES DAILY
Qty: 21 | Refills: 0 | Status: ACTIVE | COMMUNITY
Start: 2022-01-01 | End: 1900-01-01

## 2022-01-01 RX ORDER — HYDROCORTISONE 20 MG
50 TABLET ORAL EVERY 6 HOURS
Refills: 0 | Status: DISCONTINUED | OUTPATIENT
Start: 2022-01-01 | End: 2022-01-01

## 2022-01-01 RX ORDER — OXYCODONE HYDROCHLORIDE 5 MG/1
5 TABLET ORAL ONCE
Refills: 0 | Status: DISCONTINUED | OUTPATIENT
Start: 2022-01-01 | End: 2022-01-01

## 2022-01-01 RX ORDER — MIDODRINE HYDROCHLORIDE 2.5 MG/1
10 TABLET ORAL EVERY 8 HOURS
Refills: 0 | Status: DISCONTINUED | OUTPATIENT
Start: 2022-01-01 | End: 2022-01-01

## 2022-01-01 RX ORDER — ACETAMINOPHEN 500 MG
650 TABLET ORAL ONCE
Refills: 0 | Status: COMPLETED | OUTPATIENT
Start: 2022-01-01 | End: 2022-01-01

## 2022-01-01 RX ORDER — PIPERACILLIN AND TAZOBACTAM 4; .5 G/20ML; G/20ML
3.38 INJECTION, POWDER, LYOPHILIZED, FOR SOLUTION INTRAVENOUS EVERY 8 HOURS
Refills: 0 | Status: DISCONTINUED | OUTPATIENT
Start: 2022-01-01 | End: 2022-01-01

## 2022-01-01 RX ORDER — SODIUM CHLORIDE 9 MG/ML
10 INJECTION INTRAMUSCULAR; INTRAVENOUS; SUBCUTANEOUS
Refills: 0 | Status: DISCONTINUED | OUTPATIENT
Start: 2022-01-01 | End: 2022-01-01

## 2022-01-01 RX ORDER — ERTAPENEM SODIUM 1 G/1
1000 INJECTION, POWDER, LYOPHILIZED, FOR SOLUTION INTRAMUSCULAR; INTRAVENOUS EVERY 24 HOURS
Refills: 0 | Status: COMPLETED | OUTPATIENT
Start: 2022-01-01 | End: 2022-01-01

## 2022-01-01 RX ORDER — TAMSULOSIN HYDROCHLORIDE 0.4 MG/1
0.4 CAPSULE ORAL AT BEDTIME
Refills: 0 | Status: DISCONTINUED | OUTPATIENT
Start: 2022-01-01 | End: 2022-01-01

## 2022-01-01 RX ORDER — TAMSULOSIN HYDROCHLORIDE 0.4 MG/1
0.4 CAPSULE ORAL
Qty: 90 | Refills: 3 | Status: ACTIVE | COMMUNITY
Start: 2022-01-01 | End: 1900-01-01

## 2022-01-01 RX ORDER — SODIUM CHLORIDE 9 MG/ML
500 INJECTION INTRAMUSCULAR; INTRAVENOUS; SUBCUTANEOUS ONCE
Refills: 0 | Status: COMPLETED | OUTPATIENT
Start: 2022-01-01 | End: 2022-01-01

## 2022-01-01 RX ORDER — ONDANSETRON 8 MG/1
4 TABLET, FILM COATED ORAL EVERY 8 HOURS
Refills: 0 | Status: DISCONTINUED | OUTPATIENT
Start: 2022-01-01 | End: 2022-01-01

## 2022-01-01 RX ORDER — ALBUTEROL 90 UG/1
2.5 AEROSOL, METERED ORAL EVERY 6 HOURS
Refills: 0 | Status: DISCONTINUED | OUTPATIENT
Start: 2022-01-01 | End: 2022-01-01

## 2022-01-01 RX ORDER — CHLORHEXIDINE GLUCONATE 213 G/1000ML
15 SOLUTION TOPICAL EVERY 12 HOURS
Refills: 0 | Status: DISCONTINUED | OUTPATIENT
Start: 2022-01-01 | End: 2022-01-01

## 2022-01-01 RX ORDER — CHLORHEXIDINE GLUCONATE 213 G/1000ML
1 SOLUTION TOPICAL
Refills: 0 | Status: DISCONTINUED | OUTPATIENT
Start: 2022-01-01 | End: 2022-01-01

## 2022-01-01 RX ORDER — ENOXAPARIN SODIUM 100 MG/ML
60 INJECTION SUBCUTANEOUS EVERY 12 HOURS
Refills: 0 | Status: DISCONTINUED | OUTPATIENT
Start: 2022-01-01 | End: 2022-01-01

## 2022-01-01 RX ORDER — SODIUM CHLORIDE 9 MG/ML
4 INJECTION INTRAMUSCULAR; INTRAVENOUS; SUBCUTANEOUS EVERY 12 HOURS
Refills: 0 | Status: DISCONTINUED | OUTPATIENT
Start: 2022-01-01 | End: 2022-01-01

## 2022-01-01 RX ORDER — SODIUM BICARBONATE 1 MEQ/ML
100 SYRINGE (ML) INTRAVENOUS ONCE
Refills: 0 | Status: COMPLETED | OUTPATIENT
Start: 2022-01-01 | End: 2022-01-01

## 2022-01-01 RX ORDER — NIRMATRELVIR AND RITONAVIR 300-100 MG
20 X 150 MG & KIT ORAL
Qty: 1 | Refills: 0 | Status: ACTIVE | COMMUNITY
Start: 2022-01-01 | End: 1900-01-01

## 2022-01-01 RX ORDER — ALBUTEROL 90 UG/1
2 AEROSOL, METERED ORAL EVERY 6 HOURS
Refills: 0 | Status: DISCONTINUED | OUTPATIENT
Start: 2022-01-01 | End: 2022-01-01

## 2022-01-01 RX ORDER — DEXAMETHASONE 0.5 MG/5ML
6 ELIXIR ORAL DAILY
Refills: 0 | Status: DISCONTINUED | OUTPATIENT
Start: 2022-01-01 | End: 2022-01-01

## 2022-01-01 RX ORDER — FUROSEMIDE 40 MG/1
40 TABLET ORAL DAILY
Qty: 90 | Refills: 3 | Status: ACTIVE | COMMUNITY
Start: 2022-01-01 | End: 1900-01-01

## 2022-01-01 RX ORDER — FUROSEMIDE 40 MG/1
40 TABLET ORAL
Refills: 0 | Status: ACTIVE | COMMUNITY

## 2022-01-01 RX ORDER — ATORVASTATIN CALCIUM 20 MG/1
20 TABLET, FILM COATED ORAL
Qty: 90 | Refills: 0 | Status: ACTIVE | COMMUNITY
Start: 2022-01-01

## 2022-01-01 RX ORDER — SODIUM CHLORIDE 9 MG/ML
2000 INJECTION, SOLUTION INTRAVENOUS ONCE
Refills: 0 | Status: COMPLETED | OUTPATIENT
Start: 2022-01-01 | End: 2022-01-01

## 2022-01-01 RX ORDER — SODIUM CHLORIDE 9 MG/ML
4 INJECTION INTRAMUSCULAR; INTRAVENOUS; SUBCUTANEOUS EVERY 6 HOURS
Refills: 0 | Status: DISCONTINUED | OUTPATIENT
Start: 2022-01-01 | End: 2022-01-01

## 2022-01-01 RX ORDER — CISATRACURIUM BESYLATE 2 MG/ML
3 INJECTION INTRAVENOUS
Qty: 200 | Refills: 0 | Status: DISCONTINUED | OUTPATIENT
Start: 2022-01-01 | End: 2022-01-01

## 2022-01-01 RX ORDER — MEROPENEM 1 G/30ML
1000 INJECTION INTRAVENOUS EVERY 8 HOURS
Refills: 0 | Status: DISCONTINUED | OUTPATIENT
Start: 2022-01-01 | End: 2022-01-01

## 2022-01-01 RX ORDER — FUROSEMIDE 40 MG
1 TABLET ORAL
Qty: 0 | Refills: 0 | DISCHARGE

## 2022-01-01 RX ORDER — APIXABAN 2.5 MG/1
1 TABLET, FILM COATED ORAL
Qty: 60 | Refills: 0
Start: 2022-01-01 | End: 2022-11-01

## 2022-01-01 RX ORDER — MEROPENEM 1 G/30ML
1000 INJECTION INTRAVENOUS
Refills: 0 | Status: DISCONTINUED | OUTPATIENT
Start: 2022-01-01 | End: 2022-01-01

## 2022-01-01 RX ORDER — VANCOMYCIN HCL 1 G
1250 VIAL (EA) INTRAVENOUS
Refills: 0 | Status: DISCONTINUED | OUTPATIENT
Start: 2022-01-01 | End: 2022-01-01

## 2022-01-01 RX ORDER — POTASSIUM CHLORIDE 20 MEQ
20 PACKET (EA) ORAL ONCE
Refills: 0 | Status: COMPLETED | OUTPATIENT
Start: 2022-01-01 | End: 2022-01-01

## 2022-01-01 RX ORDER — LANOLIN ALCOHOL/MO/W.PET/CERES
3 CREAM (GRAM) TOPICAL AT BEDTIME
Refills: 0 | Status: DISCONTINUED | OUTPATIENT
Start: 2022-01-01 | End: 2022-01-01

## 2022-01-01 RX ORDER — ATORVASTATIN CALCIUM 20 MG/1
20 TABLET, FILM COATED ORAL
Refills: 0 | Status: ACTIVE | COMMUNITY

## 2022-01-01 RX ORDER — MIDODRINE HYDROCHLORIDE 2.5 MG/1
5 TABLET ORAL EVERY 8 HOURS
Refills: 0 | Status: DISCONTINUED | OUTPATIENT
Start: 2022-01-01 | End: 2022-01-01

## 2022-01-01 RX ORDER — BUMETANIDE 0.25 MG/ML
2 INJECTION INTRAMUSCULAR; INTRAVENOUS ONCE
Refills: 0 | Status: COMPLETED | OUTPATIENT
Start: 2022-01-01 | End: 2022-01-01

## 2022-01-01 RX ORDER — FENTANYL CITRATE 50 UG/ML
100 INJECTION INTRAVENOUS ONCE
Refills: 0 | Status: DISCONTINUED | OUTPATIENT
Start: 2022-01-01 | End: 2022-01-01

## 2022-01-01 RX ORDER — HYDROCORTISONE 20 MG
100 TABLET ORAL ONCE
Refills: 0 | Status: COMPLETED | OUTPATIENT
Start: 2022-01-01 | End: 2022-01-01

## 2022-01-01 RX ORDER — SODIUM CHLORIDE 9 MG/ML
1000 INJECTION, SOLUTION INTRAVENOUS ONCE
Refills: 0 | Status: COMPLETED | OUTPATIENT
Start: 2022-01-01 | End: 2022-01-01

## 2022-01-01 RX ORDER — ATORVASTATIN CALCIUM 80 MG/1
1 TABLET, FILM COATED ORAL
Qty: 0 | Refills: 0 | DISCHARGE

## 2022-01-01 RX ORDER — PIPERACILLIN AND TAZOBACTAM 4; .5 G/20ML; G/20ML
3.38 INJECTION, POWDER, LYOPHILIZED, FOR SOLUTION INTRAVENOUS ONCE
Refills: 0 | Status: COMPLETED | OUTPATIENT
Start: 2022-01-01 | End: 2022-01-01

## 2022-01-01 RX ORDER — SODIUM,POTASSIUM PHOSPHATES 278-250MG
1 POWDER IN PACKET (EA) ORAL EVERY 4 HOURS
Refills: 0 | Status: COMPLETED | OUTPATIENT
Start: 2022-01-01 | End: 2022-01-01

## 2022-01-01 RX ORDER — IPRATROPIUM BROMIDE 42 UG/1
0.06 SPRAY NASAL
Qty: 75 | Refills: 0 | Status: ACTIVE | COMMUNITY
Start: 2022-01-01

## 2022-01-01 RX ORDER — PHENAZOPYRIDINE HCL 100 MG
100 TABLET ORAL EVERY 8 HOURS
Refills: 0 | Status: DISCONTINUED | OUTPATIENT
Start: 2022-01-01 | End: 2022-01-01

## 2022-01-01 RX ORDER — SODIUM CHLORIDE 9 MG/ML
1000 INJECTION INTRAMUSCULAR; INTRAVENOUS; SUBCUTANEOUS
Refills: 0 | Status: DISCONTINUED | OUTPATIENT
Start: 2022-01-01 | End: 2022-01-01

## 2022-01-01 RX ORDER — ATORVASTATIN CALCIUM 80 MG/1
20 TABLET, FILM COATED ORAL AT BEDTIME
Refills: 0 | Status: DISCONTINUED | OUTPATIENT
Start: 2022-01-01 | End: 2022-01-01

## 2022-01-01 RX ORDER — TAMSULOSIN HYDROCHLORIDE 0.4 MG/1
1 CAPSULE ORAL
Qty: 0 | Refills: 0 | DISCHARGE

## 2022-01-01 RX ORDER — SODIUM CHLORIDE 9 MG/ML
1000 INJECTION, SOLUTION INTRAVENOUS
Refills: 0 | Status: DISCONTINUED | OUTPATIENT
Start: 2022-01-01 | End: 2022-01-01

## 2022-01-01 RX ORDER — VANCOMYCIN HCL 1 G
1000 VIAL (EA) INTRAVENOUS ONCE
Refills: 0 | Status: COMPLETED | OUTPATIENT
Start: 2022-01-01 | End: 2022-01-01

## 2022-01-01 RX ORDER — VASOPRESSIN 20 [USP'U]/ML
0.04 INJECTION INTRAVENOUS
Qty: 40 | Refills: 0 | Status: DISCONTINUED | OUTPATIENT
Start: 2022-01-01 | End: 2022-01-01

## 2022-01-01 RX ORDER — ACETAMINOPHEN 500 MG
650 TABLET ORAL EVERY 6 HOURS
Refills: 0 | Status: DISCONTINUED | OUTPATIENT
Start: 2022-01-01 | End: 2022-01-01

## 2022-01-01 RX ORDER — PROPOFOL 10 MG/ML
10 INJECTION, EMULSION INTRAVENOUS
Qty: 1000 | Refills: 0 | Status: DISCONTINUED | OUTPATIENT
Start: 2022-01-01 | End: 2022-01-01

## 2022-01-01 RX ORDER — POTASSIUM PHOSPHATE, MONOBASIC POTASSIUM PHOSPHATE, DIBASIC 236; 224 MG/ML; MG/ML
15 INJECTION, SOLUTION INTRAVENOUS ONCE
Refills: 0 | Status: COMPLETED | OUTPATIENT
Start: 2022-01-01 | End: 2022-01-01

## 2022-01-01 RX ORDER — MIDAZOLAM HYDROCHLORIDE 1 MG/ML
4 INJECTION, SOLUTION INTRAMUSCULAR; INTRAVENOUS ONCE
Refills: 0 | Status: DISCONTINUED | OUTPATIENT
Start: 2022-01-01 | End: 2022-01-01

## 2022-01-01 RX ORDER — PHENYLEPHRINE HYDROCHLORIDE 10 MG/ML
0.5 INJECTION INTRAVENOUS
Qty: 160 | Refills: 0 | Status: DISCONTINUED | OUTPATIENT
Start: 2022-01-01 | End: 2022-01-01

## 2022-01-01 RX ORDER — SODIUM CHLORIDE 9 MG/ML
4 INJECTION INTRAMUSCULAR; INTRAVENOUS; SUBCUTANEOUS EVERY 6 HOURS
Refills: 0 | Status: COMPLETED | OUTPATIENT
Start: 2022-01-01 | End: 2022-01-01

## 2022-01-01 RX ADMIN — MIDODRINE HYDROCHLORIDE 5 MILLIGRAM(S): 2.5 TABLET ORAL at 05:12

## 2022-01-01 RX ADMIN — ALBUTEROL 2 PUFF(S): 90 AEROSOL, METERED ORAL at 00:10

## 2022-01-01 RX ADMIN — VASOPRESSIN 6 UNIT(S)/MIN: 20 INJECTION INTRAVENOUS at 01:25

## 2022-01-01 RX ADMIN — FENTANYL CITRATE 100 MICROGRAM(S): 50 INJECTION INTRAVENOUS at 12:35

## 2022-01-01 RX ADMIN — ENOXAPARIN SODIUM 60 MILLIGRAM(S): 100 INJECTION SUBCUTANEOUS at 17:49

## 2022-01-01 RX ADMIN — OXYCODONE HYDROCHLORIDE 5 MILLIGRAM(S): 5 TABLET ORAL at 10:36

## 2022-01-01 RX ADMIN — PHENYLEPHRINE HYDROCHLORIDE 6.49 MICROGRAM(S)/KG/MIN: 10 INJECTION INTRAVENOUS at 05:25

## 2022-01-01 RX ADMIN — MIDODRINE HYDROCHLORIDE 10 MILLIGRAM(S): 2.5 TABLET ORAL at 06:42

## 2022-01-01 RX ADMIN — ERTAPENEM SODIUM 120 MILLIGRAM(S): 1 INJECTION, POWDER, LYOPHILIZED, FOR SOLUTION INTRAMUSCULAR; INTRAVENOUS at 22:10

## 2022-01-01 RX ADMIN — SODIUM CHLORIDE 4 MILLILITER(S): 9 INJECTION INTRAMUSCULAR; INTRAVENOUS; SUBCUTANEOUS at 17:31

## 2022-01-01 RX ADMIN — SODIUM CHLORIDE 4 MILLILITER(S): 9 INJECTION INTRAMUSCULAR; INTRAVENOUS; SUBCUTANEOUS at 00:37

## 2022-01-01 RX ADMIN — SODIUM CHLORIDE 4 MILLILITER(S): 9 INJECTION INTRAMUSCULAR; INTRAVENOUS; SUBCUTANEOUS at 11:35

## 2022-01-01 RX ADMIN — ENOXAPARIN SODIUM 60 MILLIGRAM(S): 100 INJECTION SUBCUTANEOUS at 06:15

## 2022-01-01 RX ADMIN — MIDODRINE HYDROCHLORIDE 5 MILLIGRAM(S): 2.5 TABLET ORAL at 06:10

## 2022-01-01 RX ADMIN — SODIUM CHLORIDE 4 MILLILITER(S): 9 INJECTION INTRAMUSCULAR; INTRAVENOUS; SUBCUTANEOUS at 17:59

## 2022-01-01 RX ADMIN — ALBUTEROL 2.5 MILLIGRAM(S): 90 AEROSOL, METERED ORAL at 00:04

## 2022-01-01 RX ADMIN — Medication 3.24 MICROGRAM(S)/KG/MIN: at 05:25

## 2022-01-01 RX ADMIN — ENOXAPARIN SODIUM 60 MILLIGRAM(S): 100 INJECTION SUBCUTANEOUS at 11:51

## 2022-01-01 RX ADMIN — SODIUM CHLORIDE 4 MILLILITER(S): 9 INJECTION INTRAMUSCULAR; INTRAVENOUS; SUBCUTANEOUS at 18:32

## 2022-01-01 RX ADMIN — PIPERACILLIN AND TAZOBACTAM 25 GRAM(S): 4; .5 INJECTION, POWDER, LYOPHILIZED, FOR SOLUTION INTRAVENOUS at 06:14

## 2022-01-01 RX ADMIN — MIDODRINE HYDROCHLORIDE 5 MILLIGRAM(S): 2.5 TABLET ORAL at 14:28

## 2022-01-01 RX ADMIN — SODIUM CHLORIDE 4 MILLILITER(S): 9 INJECTION INTRAMUSCULAR; INTRAVENOUS; SUBCUTANEOUS at 17:49

## 2022-01-01 RX ADMIN — TAMSULOSIN HYDROCHLORIDE 0.4 MILLIGRAM(S): 0.4 CAPSULE ORAL at 21:16

## 2022-01-01 RX ADMIN — MIDODRINE HYDROCHLORIDE 10 MILLIGRAM(S): 2.5 TABLET ORAL at 14:27

## 2022-01-01 RX ADMIN — ATORVASTATIN CALCIUM 20 MILLIGRAM(S): 80 TABLET, FILM COATED ORAL at 22:04

## 2022-01-01 RX ADMIN — TAMSULOSIN HYDROCHLORIDE 0.4 MILLIGRAM(S): 0.4 CAPSULE ORAL at 21:59

## 2022-01-01 RX ADMIN — ENOXAPARIN SODIUM 60 MILLIGRAM(S): 100 INJECTION SUBCUTANEOUS at 17:35

## 2022-01-01 RX ADMIN — MEROPENEM 100 MILLIGRAM(S): 1 INJECTION INTRAVENOUS at 00:02

## 2022-01-01 RX ADMIN — SODIUM CHLORIDE 100 MILLILITER(S): 9 INJECTION, SOLUTION INTRAVENOUS at 18:09

## 2022-01-01 RX ADMIN — ALBUTEROL 2.5 MILLIGRAM(S): 90 AEROSOL, METERED ORAL at 23:10

## 2022-01-01 RX ADMIN — ALBUTEROL 2 PUFF(S): 90 AEROSOL, METERED ORAL at 05:13

## 2022-01-01 RX ADMIN — SODIUM CHLORIDE 4 MILLILITER(S): 9 INJECTION INTRAMUSCULAR; INTRAVENOUS; SUBCUTANEOUS at 17:18

## 2022-01-01 RX ADMIN — MIDODRINE HYDROCHLORIDE 10 MILLIGRAM(S): 2.5 TABLET ORAL at 05:16

## 2022-01-01 RX ADMIN — ERTAPENEM SODIUM 120 MILLIGRAM(S): 1 INJECTION, POWDER, LYOPHILIZED, FOR SOLUTION INTRAMUSCULAR; INTRAVENOUS at 22:27

## 2022-01-01 RX ADMIN — Medication 3.24 MICROGRAM(S)/KG/MIN: at 23:38

## 2022-01-01 RX ADMIN — ATORVASTATIN CALCIUM 20 MILLIGRAM(S): 80 TABLET, FILM COATED ORAL at 21:06

## 2022-01-01 RX ADMIN — PROPOFOL 3.66 MICROGRAM(S)/KG/MIN: 10 INJECTION, EMULSION INTRAVENOUS at 20:55

## 2022-01-01 RX ADMIN — ALBUTEROL 2 PUFF(S): 90 AEROSOL, METERED ORAL at 12:21

## 2022-01-01 RX ADMIN — MEROPENEM 100 MILLIGRAM(S): 1 INJECTION INTRAVENOUS at 21:52

## 2022-01-01 RX ADMIN — ENOXAPARIN SODIUM 60 MILLIGRAM(S): 100 INJECTION SUBCUTANEOUS at 21:24

## 2022-01-01 RX ADMIN — SODIUM CHLORIDE 4 MILLILITER(S): 9 INJECTION INTRAMUSCULAR; INTRAVENOUS; SUBCUTANEOUS at 17:12

## 2022-01-01 RX ADMIN — Medication 650 MILLIGRAM(S): at 21:16

## 2022-01-01 RX ADMIN — Medication 3.24 MICROGRAM(S)/KG/MIN: at 14:25

## 2022-01-01 RX ADMIN — ALBUTEROL 2 PUFF(S): 90 AEROSOL, METERED ORAL at 17:53

## 2022-01-01 RX ADMIN — Medication 6 MILLIGRAM(S): at 05:25

## 2022-01-01 RX ADMIN — ENOXAPARIN SODIUM 60 MILLIGRAM(S): 100 INJECTION SUBCUTANEOUS at 05:34

## 2022-01-01 RX ADMIN — SODIUM CHLORIDE 1000 MILLILITER(S): 9 INJECTION, SOLUTION INTRAVENOUS at 01:07

## 2022-01-01 RX ADMIN — MIDODRINE HYDROCHLORIDE 10 MILLIGRAM(S): 2.5 TABLET ORAL at 14:10

## 2022-01-01 RX ADMIN — PIPERACILLIN AND TAZOBACTAM 25 GRAM(S): 4; .5 INJECTION, POWDER, LYOPHILIZED, FOR SOLUTION INTRAVENOUS at 14:59

## 2022-01-01 RX ADMIN — ENOXAPARIN SODIUM 60 MILLIGRAM(S): 100 INJECTION SUBCUTANEOUS at 06:09

## 2022-01-01 RX ADMIN — MEROPENEM 100 MILLIGRAM(S): 1 INJECTION INTRAVENOUS at 05:25

## 2022-01-01 RX ADMIN — OXYCODONE HYDROCHLORIDE 5 MILLIGRAM(S): 5 TABLET ORAL at 03:10

## 2022-01-01 RX ADMIN — Medication 650 MILLIGRAM(S): at 23:01

## 2022-01-01 RX ADMIN — SODIUM CHLORIDE 75 MILLILITER(S): 9 INJECTION INTRAMUSCULAR; INTRAVENOUS; SUBCUTANEOUS at 20:59

## 2022-01-01 RX ADMIN — CHLORHEXIDINE GLUCONATE 15 MILLILITER(S): 213 SOLUTION TOPICAL at 17:53

## 2022-01-01 RX ADMIN — SODIUM CHLORIDE 4 MILLILITER(S): 9 INJECTION INTRAMUSCULAR; INTRAVENOUS; SUBCUTANEOUS at 06:24

## 2022-01-01 RX ADMIN — CISATRACURIUM BESYLATE 12.5 MICROGRAM(S)/KG/MIN: 2 INJECTION INTRAVENOUS at 01:49

## 2022-01-01 RX ADMIN — PHENYLEPHRINE HYDROCHLORIDE 6.49 MICROGRAM(S)/KG/MIN: 10 INJECTION INTRAVENOUS at 20:55

## 2022-01-01 RX ADMIN — PROPOFOL 3.66 MICROGRAM(S)/KG/MIN: 10 INJECTION, EMULSION INTRAVENOUS at 22:46

## 2022-01-01 RX ADMIN — PIPERACILLIN AND TAZOBACTAM 3.38 GRAM(S): 4; .5 INJECTION, POWDER, LYOPHILIZED, FOR SOLUTION INTRAVENOUS at 21:42

## 2022-01-01 RX ADMIN — Medication 100 MILLIEQUIVALENT(S): at 10:18

## 2022-01-01 RX ADMIN — SODIUM CHLORIDE 4 MILLILITER(S): 9 INJECTION INTRAMUSCULAR; INTRAVENOUS; SUBCUTANEOUS at 00:38

## 2022-01-01 RX ADMIN — ATORVASTATIN CALCIUM 20 MILLIGRAM(S): 80 TABLET, FILM COATED ORAL at 21:27

## 2022-01-01 RX ADMIN — MIDAZOLAM HYDROCHLORIDE 4 MILLIGRAM(S): 1 INJECTION, SOLUTION INTRAMUSCULAR; INTRAVENOUS at 21:15

## 2022-01-01 RX ADMIN — ENOXAPARIN SODIUM 60 MILLIGRAM(S): 100 INJECTION SUBCUTANEOUS at 10:53

## 2022-01-01 RX ADMIN — Medication 1 PACKET(S): at 17:34

## 2022-01-01 RX ADMIN — MIDODRINE HYDROCHLORIDE 5 MILLIGRAM(S): 2.5 TABLET ORAL at 06:09

## 2022-01-01 RX ADMIN — PHENYLEPHRINE HYDROCHLORIDE 6.49 MICROGRAM(S)/KG/MIN: 10 INJECTION INTRAVENOUS at 15:00

## 2022-01-01 RX ADMIN — Medication 650 MILLIGRAM(S): at 22:20

## 2022-01-01 RX ADMIN — PIPERACILLIN AND TAZOBACTAM 25 GRAM(S): 4; .5 INJECTION, POWDER, LYOPHILIZED, FOR SOLUTION INTRAVENOUS at 21:27

## 2022-01-01 RX ADMIN — TAMSULOSIN HYDROCHLORIDE 0.4 MILLIGRAM(S): 0.4 CAPSULE ORAL at 21:41

## 2022-01-01 RX ADMIN — CHLORHEXIDINE GLUCONATE 15 MILLILITER(S): 213 SOLUTION TOPICAL at 06:27

## 2022-01-01 RX ADMIN — ATORVASTATIN CALCIUM 20 MILLIGRAM(S): 80 TABLET, FILM COATED ORAL at 21:40

## 2022-01-01 RX ADMIN — SODIUM CHLORIDE 4 MILLILITER(S): 9 INJECTION INTRAMUSCULAR; INTRAVENOUS; SUBCUTANEOUS at 01:13

## 2022-01-01 RX ADMIN — ENOXAPARIN SODIUM 60 MILLIGRAM(S): 100 INJECTION SUBCUTANEOUS at 05:07

## 2022-01-01 RX ADMIN — ENOXAPARIN SODIUM 60 MILLIGRAM(S): 100 INJECTION SUBCUTANEOUS at 11:34

## 2022-01-01 RX ADMIN — Medication 166.67 MILLIGRAM(S): at 20:05

## 2022-01-01 RX ADMIN — TAMSULOSIN HYDROCHLORIDE 0.4 MILLIGRAM(S): 0.4 CAPSULE ORAL at 22:03

## 2022-01-01 RX ADMIN — ENOXAPARIN SODIUM 60 MILLIGRAM(S): 100 INJECTION SUBCUTANEOUS at 18:33

## 2022-01-01 RX ADMIN — MIDODRINE HYDROCHLORIDE 5 MILLIGRAM(S): 2.5 TABLET ORAL at 21:59

## 2022-01-01 RX ADMIN — SODIUM CHLORIDE 4 MILLILITER(S): 9 INJECTION INTRAMUSCULAR; INTRAVENOUS; SUBCUTANEOUS at 00:05

## 2022-01-01 RX ADMIN — TAMSULOSIN HYDROCHLORIDE 0.4 MILLIGRAM(S): 0.4 CAPSULE ORAL at 21:50

## 2022-01-01 RX ADMIN — SODIUM CHLORIDE 4 MILLILITER(S): 9 INJECTION INTRAMUSCULAR; INTRAVENOUS; SUBCUTANEOUS at 23:06

## 2022-01-01 RX ADMIN — Medication 3.24 MICROGRAM(S)/KG/MIN: at 10:18

## 2022-01-01 RX ADMIN — SODIUM CHLORIDE 4 MILLILITER(S): 9 INJECTION INTRAMUSCULAR; INTRAVENOUS; SUBCUTANEOUS at 06:12

## 2022-01-01 RX ADMIN — SODIUM CHLORIDE 4 MILLILITER(S): 9 INJECTION INTRAMUSCULAR; INTRAVENOUS; SUBCUTANEOUS at 11:31

## 2022-01-01 RX ADMIN — POTASSIUM PHOSPHATE, MONOBASIC POTASSIUM PHOSPHATE, DIBASIC 62.5 MILLIMOLE(S): 236; 224 INJECTION, SOLUTION INTRAVENOUS at 12:53

## 2022-01-01 RX ADMIN — ALBUTEROL 2 PUFF(S): 90 AEROSOL, METERED ORAL at 00:56

## 2022-01-01 RX ADMIN — SODIUM CHLORIDE 4 MILLILITER(S): 9 INJECTION INTRAMUSCULAR; INTRAVENOUS; SUBCUTANEOUS at 06:03

## 2022-01-01 RX ADMIN — ALBUTEROL 2.5 MILLIGRAM(S): 90 AEROSOL, METERED ORAL at 11:54

## 2022-01-01 RX ADMIN — ATORVASTATIN CALCIUM 20 MILLIGRAM(S): 80 TABLET, FILM COATED ORAL at 21:59

## 2022-01-01 RX ADMIN — SODIUM CHLORIDE 4 MILLILITER(S): 9 INJECTION INTRAMUSCULAR; INTRAVENOUS; SUBCUTANEOUS at 05:20

## 2022-01-01 RX ADMIN — PIPERACILLIN AND TAZOBACTAM 25 GRAM(S): 4; .5 INJECTION, POWDER, LYOPHILIZED, FOR SOLUTION INTRAVENOUS at 14:10

## 2022-01-01 RX ADMIN — MIDODRINE HYDROCHLORIDE 5 MILLIGRAM(S): 2.5 TABLET ORAL at 15:15

## 2022-01-01 RX ADMIN — ATORVASTATIN CALCIUM 20 MILLIGRAM(S): 80 TABLET, FILM COATED ORAL at 22:03

## 2022-01-01 RX ADMIN — SODIUM CHLORIDE 4 MILLILITER(S): 9 INJECTION INTRAMUSCULAR; INTRAVENOUS; SUBCUTANEOUS at 05:30

## 2022-01-01 RX ADMIN — FENTANYL CITRATE 100 MICROGRAM(S): 50 INJECTION INTRAVENOUS at 10:40

## 2022-01-01 RX ADMIN — TAMSULOSIN HYDROCHLORIDE 0.4 MILLIGRAM(S): 0.4 CAPSULE ORAL at 21:52

## 2022-01-01 RX ADMIN — ALBUTEROL 2 PUFF(S): 90 AEROSOL, METERED ORAL at 19:16

## 2022-01-01 RX ADMIN — SODIUM CHLORIDE 4 MILLILITER(S): 9 INJECTION INTRAMUSCULAR; INTRAVENOUS; SUBCUTANEOUS at 18:33

## 2022-01-01 RX ADMIN — SODIUM CHLORIDE 4 MILLILITER(S): 9 INJECTION INTRAMUSCULAR; INTRAVENOUS; SUBCUTANEOUS at 14:55

## 2022-01-01 RX ADMIN — SODIUM CHLORIDE 4 MILLILITER(S): 9 INJECTION INTRAMUSCULAR; INTRAVENOUS; SUBCUTANEOUS at 05:47

## 2022-01-01 RX ADMIN — Medication 3.24 MICROGRAM(S)/KG/MIN: at 01:25

## 2022-01-01 RX ADMIN — ERTAPENEM SODIUM 120 MILLIGRAM(S): 1 INJECTION, POWDER, LYOPHILIZED, FOR SOLUTION INTRAMUSCULAR; INTRAVENOUS at 21:42

## 2022-01-01 RX ADMIN — MEROPENEM 100 MILLIGRAM(S): 1 INJECTION INTRAVENOUS at 14:00

## 2022-01-01 RX ADMIN — PIPERACILLIN AND TAZOBACTAM 25 GRAM(S): 4; .5 INJECTION, POWDER, LYOPHILIZED, FOR SOLUTION INTRAVENOUS at 21:50

## 2022-01-01 RX ADMIN — MIDODRINE HYDROCHLORIDE 10 MILLIGRAM(S): 2.5 TABLET ORAL at 15:00

## 2022-01-01 RX ADMIN — ALBUTEROL 2 PUFF(S): 90 AEROSOL, METERED ORAL at 06:10

## 2022-01-01 RX ADMIN — TAMSULOSIN HYDROCHLORIDE 0.4 MILLIGRAM(S): 0.4 CAPSULE ORAL at 21:06

## 2022-01-01 RX ADMIN — BUMETANIDE 2 MILLIGRAM(S): 0.25 INJECTION INTRAMUSCULAR; INTRAVENOUS at 10:18

## 2022-01-01 RX ADMIN — PIPERACILLIN AND TAZOBACTAM 25 GRAM(S): 4; .5 INJECTION, POWDER, LYOPHILIZED, FOR SOLUTION INTRAVENOUS at 05:16

## 2022-01-01 RX ADMIN — PIPERACILLIN AND TAZOBACTAM 25 GRAM(S): 4; .5 INJECTION, POWDER, LYOPHILIZED, FOR SOLUTION INTRAVENOUS at 05:06

## 2022-01-01 RX ADMIN — ALBUTEROL 2 PUFF(S): 90 AEROSOL, METERED ORAL at 11:56

## 2022-01-01 RX ADMIN — SODIUM CHLORIDE 4 MILLILITER(S): 9 INJECTION INTRAMUSCULAR; INTRAVENOUS; SUBCUTANEOUS at 18:11

## 2022-01-01 RX ADMIN — ENOXAPARIN SODIUM 60 MILLIGRAM(S): 100 INJECTION SUBCUTANEOUS at 21:28

## 2022-01-01 RX ADMIN — VASOPRESSIN 6 UNIT(S)/MIN: 20 INJECTION INTRAVENOUS at 14:25

## 2022-01-01 RX ADMIN — MIDODRINE HYDROCHLORIDE 5 MILLIGRAM(S): 2.5 TABLET ORAL at 22:10

## 2022-01-01 RX ADMIN — ALBUTEROL 2.5 MILLIGRAM(S): 90 AEROSOL, METERED ORAL at 01:11

## 2022-01-01 RX ADMIN — TAMSULOSIN HYDROCHLORIDE 0.4 MILLIGRAM(S): 0.4 CAPSULE ORAL at 22:04

## 2022-01-01 RX ADMIN — MIDODRINE HYDROCHLORIDE 10 MILLIGRAM(S): 2.5 TABLET ORAL at 21:27

## 2022-01-01 RX ADMIN — ALBUTEROL 2 PUFF(S): 90 AEROSOL, METERED ORAL at 17:35

## 2022-01-01 RX ADMIN — Medication 100 MILLIGRAM(S): at 17:54

## 2022-01-01 RX ADMIN — Medication 6 MILLIGRAM(S): at 21:03

## 2022-01-01 RX ADMIN — ENOXAPARIN SODIUM 60 MILLIGRAM(S): 100 INJECTION SUBCUTANEOUS at 06:24

## 2022-01-01 RX ADMIN — SODIUM CHLORIDE 4 MILLILITER(S): 9 INJECTION INTRAMUSCULAR; INTRAVENOUS; SUBCUTANEOUS at 05:11

## 2022-01-01 RX ADMIN — SODIUM CHLORIDE 4 MILLILITER(S): 9 INJECTION INTRAMUSCULAR; INTRAVENOUS; SUBCUTANEOUS at 17:02

## 2022-01-01 RX ADMIN — ENOXAPARIN SODIUM 60 MILLIGRAM(S): 100 INJECTION SUBCUTANEOUS at 12:53

## 2022-01-01 RX ADMIN — MIDODRINE HYDROCHLORIDE 5 MILLIGRAM(S): 2.5 TABLET ORAL at 05:19

## 2022-01-01 RX ADMIN — SODIUM CHLORIDE 75 MILLILITER(S): 9 INJECTION, SOLUTION INTRAVENOUS at 14:28

## 2022-01-01 RX ADMIN — ALBUTEROL 2 PUFF(S): 90 AEROSOL, METERED ORAL at 05:19

## 2022-01-01 RX ADMIN — ATORVASTATIN CALCIUM 20 MILLIGRAM(S): 80 TABLET, FILM COATED ORAL at 21:52

## 2022-01-01 RX ADMIN — MIDODRINE HYDROCHLORIDE 10 MILLIGRAM(S): 2.5 TABLET ORAL at 22:01

## 2022-01-01 RX ADMIN — ALBUTEROL 2 PUFF(S): 90 AEROSOL, METERED ORAL at 05:55

## 2022-01-01 RX ADMIN — MIDODRINE HYDROCHLORIDE 10 MILLIGRAM(S): 2.5 TABLET ORAL at 14:58

## 2022-01-01 RX ADMIN — ENOXAPARIN SODIUM 60 MILLIGRAM(S): 100 INJECTION SUBCUTANEOUS at 05:16

## 2022-01-01 RX ADMIN — SODIUM CHLORIDE 4 MILLILITER(S): 9 INJECTION INTRAMUSCULAR; INTRAVENOUS; SUBCUTANEOUS at 11:39

## 2022-01-01 RX ADMIN — ENOXAPARIN SODIUM 60 MILLIGRAM(S): 100 INJECTION SUBCUTANEOUS at 05:19

## 2022-01-01 RX ADMIN — SODIUM CHLORIDE 4 MILLILITER(S): 9 INJECTION INTRAMUSCULAR; INTRAVENOUS; SUBCUTANEOUS at 06:39

## 2022-01-01 RX ADMIN — Medication 1 PACKET(S): at 11:51

## 2022-01-01 RX ADMIN — ENOXAPARIN SODIUM 60 MILLIGRAM(S): 100 INJECTION SUBCUTANEOUS at 18:17

## 2022-01-01 RX ADMIN — ALBUTEROL 2 PUFF(S): 90 AEROSOL, METERED ORAL at 11:58

## 2022-01-01 RX ADMIN — ALBUTEROL 2 PUFF(S): 90 AEROSOL, METERED ORAL at 12:16

## 2022-01-01 RX ADMIN — MORPHINE SULFATE 2 MILLIGRAM(S): 50 CAPSULE, EXTENDED RELEASE ORAL at 12:29

## 2022-01-01 RX ADMIN — Medication 650 MILLIGRAM(S): at 21:23

## 2022-01-01 RX ADMIN — MIDODRINE HYDROCHLORIDE 5 MILLIGRAM(S): 2.5 TABLET ORAL at 15:32

## 2022-01-01 RX ADMIN — ENOXAPARIN SODIUM 60 MILLIGRAM(S): 100 INJECTION SUBCUTANEOUS at 18:30

## 2022-01-01 RX ADMIN — PROPOFOL 3.66 MICROGRAM(S)/KG/MIN: 10 INJECTION, EMULSION INTRAVENOUS at 01:31

## 2022-01-01 RX ADMIN — ERTAPENEM SODIUM 120 MILLIGRAM(S): 1 INJECTION, POWDER, LYOPHILIZED, FOR SOLUTION INTRAMUSCULAR; INTRAVENOUS at 22:02

## 2022-01-01 RX ADMIN — MORPHINE SULFATE 2 MILLIGRAM(S): 50 CAPSULE, EXTENDED RELEASE ORAL at 11:29

## 2022-01-01 RX ADMIN — MIDODRINE HYDROCHLORIDE 10 MILLIGRAM(S): 2.5 TABLET ORAL at 01:08

## 2022-01-01 RX ADMIN — Medication 5.72 MICROGRAM(S)/KG/MIN: at 22:47

## 2022-01-01 RX ADMIN — Medication 50 MILLIGRAM(S): at 23:40

## 2022-01-01 RX ADMIN — MIDODRINE HYDROCHLORIDE 10 MILLIGRAM(S): 2.5 TABLET ORAL at 06:25

## 2022-01-01 RX ADMIN — ERTAPENEM SODIUM 120 MILLIGRAM(S): 1 INJECTION, POWDER, LYOPHILIZED, FOR SOLUTION INTRAMUSCULAR; INTRAVENOUS at 22:01

## 2022-01-01 RX ADMIN — ALBUTEROL 2 PUFF(S): 90 AEROSOL, METERED ORAL at 18:17

## 2022-01-01 RX ADMIN — ALBUTEROL 2.5 MILLIGRAM(S): 90 AEROSOL, METERED ORAL at 06:24

## 2022-01-01 RX ADMIN — SODIUM CHLORIDE 500 MILLILITER(S): 9 INJECTION INTRAMUSCULAR; INTRAVENOUS; SUBCUTANEOUS at 13:03

## 2022-01-01 RX ADMIN — ENOXAPARIN SODIUM 60 MILLIGRAM(S): 100 INJECTION SUBCUTANEOUS at 05:25

## 2022-01-01 RX ADMIN — MIDODRINE HYDROCHLORIDE 5 MILLIGRAM(S): 2.5 TABLET ORAL at 05:43

## 2022-01-01 RX ADMIN — MIDODRINE HYDROCHLORIDE 5 MILLIGRAM(S): 2.5 TABLET ORAL at 05:34

## 2022-01-01 RX ADMIN — SODIUM CHLORIDE 75 MILLILITER(S): 9 INJECTION INTRAMUSCULAR; INTRAVENOUS; SUBCUTANEOUS at 12:53

## 2022-01-01 RX ADMIN — MEROPENEM 100 MILLIGRAM(S): 1 INJECTION INTRAVENOUS at 12:10

## 2022-01-01 RX ADMIN — ALBUTEROL 2 PUFF(S): 90 AEROSOL, METERED ORAL at 17:41

## 2022-01-01 RX ADMIN — PROPOFOL 3.66 MICROGRAM(S)/KG/MIN: 10 INJECTION, EMULSION INTRAVENOUS at 18:45

## 2022-01-01 RX ADMIN — SODIUM CHLORIDE 75 MILLILITER(S): 9 INJECTION, SOLUTION INTRAVENOUS at 03:10

## 2022-01-01 RX ADMIN — OXYCODONE HYDROCHLORIDE 5 MILLIGRAM(S): 5 TABLET ORAL at 09:38

## 2022-01-01 RX ADMIN — PIPERACILLIN AND TAZOBACTAM 25 GRAM(S): 4; .5 INJECTION, POWDER, LYOPHILIZED, FOR SOLUTION INTRAVENOUS at 14:27

## 2022-01-01 RX ADMIN — Medication 5.53 MICROGRAM(S)/KG/MIN: at 20:30

## 2022-01-01 RX ADMIN — MIDODRINE HYDROCHLORIDE 5 MILLIGRAM(S): 2.5 TABLET ORAL at 15:10

## 2022-01-01 RX ADMIN — ATORVASTATIN CALCIUM 20 MILLIGRAM(S): 80 TABLET, FILM COATED ORAL at 21:17

## 2022-01-01 RX ADMIN — CISATRACURIUM BESYLATE 12.5 MICROGRAM(S)/KG/MIN: 2 INJECTION INTRAVENOUS at 14:25

## 2022-01-01 RX ADMIN — TAMSULOSIN HYDROCHLORIDE 0.4 MILLIGRAM(S): 0.4 CAPSULE ORAL at 21:34

## 2022-01-01 RX ADMIN — SODIUM CHLORIDE 4 MILLILITER(S): 9 INJECTION INTRAMUSCULAR; INTRAVENOUS; SUBCUTANEOUS at 17:08

## 2022-01-01 RX ADMIN — ATORVASTATIN CALCIUM 20 MILLIGRAM(S): 80 TABLET, FILM COATED ORAL at 21:50

## 2022-01-01 RX ADMIN — MIDODRINE HYDROCHLORIDE 5 MILLIGRAM(S): 2.5 TABLET ORAL at 22:04

## 2022-01-01 RX ADMIN — MIDODRINE HYDROCHLORIDE 5 MILLIGRAM(S): 2.5 TABLET ORAL at 22:03

## 2022-01-01 RX ADMIN — ALBUTEROL 2 PUFF(S): 90 AEROSOL, METERED ORAL at 06:09

## 2022-01-01 RX ADMIN — MIDODRINE HYDROCHLORIDE 5 MILLIGRAM(S): 2.5 TABLET ORAL at 05:25

## 2022-01-01 RX ADMIN — MIDODRINE HYDROCHLORIDE 10 MILLIGRAM(S): 2.5 TABLET ORAL at 21:50

## 2022-01-01 RX ADMIN — ERTAPENEM SODIUM 120 MILLIGRAM(S): 1 INJECTION, POWDER, LYOPHILIZED, FOR SOLUTION INTRAMUSCULAR; INTRAVENOUS at 21:59

## 2022-01-01 RX ADMIN — ALBUTEROL 2 PUFF(S): 90 AEROSOL, METERED ORAL at 17:49

## 2022-01-01 RX ADMIN — ALBUTEROL 2 PUFF(S): 90 AEROSOL, METERED ORAL at 11:51

## 2022-01-01 RX ADMIN — MIDODRINE HYDROCHLORIDE 5 MILLIGRAM(S): 2.5 TABLET ORAL at 14:46

## 2022-01-01 RX ADMIN — MIDODRINE HYDROCHLORIDE 5 MILLIGRAM(S): 2.5 TABLET ORAL at 15:22

## 2022-01-01 RX ADMIN — ALBUTEROL 2.5 MILLIGRAM(S): 90 AEROSOL, METERED ORAL at 17:18

## 2022-01-01 RX ADMIN — ENOXAPARIN SODIUM 60 MILLIGRAM(S): 100 INJECTION SUBCUTANEOUS at 18:08

## 2022-01-01 RX ADMIN — SODIUM CHLORIDE 4 MILLILITER(S): 9 INJECTION INTRAMUSCULAR; INTRAVENOUS; SUBCUTANEOUS at 11:12

## 2022-01-01 RX ADMIN — OXYCODONE HYDROCHLORIDE 5 MILLIGRAM(S): 5 TABLET ORAL at 02:10

## 2022-01-01 RX ADMIN — MIDODRINE HYDROCHLORIDE 10 MILLIGRAM(S): 2.5 TABLET ORAL at 14:52

## 2022-01-01 RX ADMIN — Medication 20 MILLIEQUIVALENT(S): at 19:54

## 2022-01-01 RX ADMIN — SODIUM CHLORIDE 4 MILLILITER(S): 9 INJECTION INTRAMUSCULAR; INTRAVENOUS; SUBCUTANEOUS at 11:13

## 2022-01-01 RX ADMIN — MEROPENEM 100 MILLIGRAM(S): 1 INJECTION INTRAVENOUS at 11:30

## 2022-01-01 RX ADMIN — PROPOFOL 3.66 MICROGRAM(S)/KG/MIN: 10 INJECTION, EMULSION INTRAVENOUS at 11:56

## 2022-01-01 RX ADMIN — MIDODRINE HYDROCHLORIDE 5 MILLIGRAM(S): 2.5 TABLET ORAL at 14:34

## 2022-01-01 RX ADMIN — Medication 650 MILLIGRAM(S): at 22:48

## 2022-01-01 RX ADMIN — ALBUTEROL 2 PUFF(S): 90 AEROSOL, METERED ORAL at 01:19

## 2022-01-01 RX ADMIN — PIPERACILLIN AND TAZOBACTAM 25 GRAM(S): 4; .5 INJECTION, POWDER, LYOPHILIZED, FOR SOLUTION INTRAVENOUS at 22:01

## 2022-01-01 RX ADMIN — ATORVASTATIN CALCIUM 20 MILLIGRAM(S): 80 TABLET, FILM COATED ORAL at 22:10

## 2022-01-01 RX ADMIN — Medication 250 MILLIGRAM(S): at 21:28

## 2022-01-01 RX ADMIN — SODIUM CHLORIDE 4 MILLILITER(S): 9 INJECTION INTRAMUSCULAR; INTRAVENOUS; SUBCUTANEOUS at 11:54

## 2022-01-01 RX ADMIN — SODIUM CHLORIDE 4 MILLILITER(S): 9 INJECTION INTRAMUSCULAR; INTRAVENOUS; SUBCUTANEOUS at 11:08

## 2022-01-01 RX ADMIN — SODIUM CHLORIDE 4 MILLILITER(S): 9 INJECTION INTRAMUSCULAR; INTRAVENOUS; SUBCUTANEOUS at 06:48

## 2022-01-01 RX ADMIN — ALBUTEROL 2 PUFF(S): 90 AEROSOL, METERED ORAL at 05:25

## 2022-01-01 RX ADMIN — Medication 20 MILLIGRAM(S): at 19:55

## 2022-01-01 RX ADMIN — SODIUM CHLORIDE 4 MILLILITER(S): 9 INJECTION INTRAMUSCULAR; INTRAVENOUS; SUBCUTANEOUS at 00:06

## 2022-01-01 RX ADMIN — CHLORHEXIDINE GLUCONATE 1 APPLICATION(S): 213 SOLUTION TOPICAL at 05:26

## 2022-01-01 RX ADMIN — Medication 650 MILLIGRAM(S): at 23:48

## 2022-01-01 RX ADMIN — ENOXAPARIN SODIUM 60 MILLIGRAM(S): 100 INJECTION SUBCUTANEOUS at 17:41

## 2022-01-01 RX ADMIN — TAMSULOSIN HYDROCHLORIDE 0.4 MILLIGRAM(S): 0.4 CAPSULE ORAL at 22:10

## 2022-01-01 RX ADMIN — SODIUM CHLORIDE 2000 MILLILITER(S): 9 INJECTION, SOLUTION INTRAVENOUS at 21:09

## 2022-01-01 RX ADMIN — PIPERACILLIN AND TAZOBACTAM 200 GRAM(S): 4; .5 INJECTION, POWDER, LYOPHILIZED, FOR SOLUTION INTRAVENOUS at 20:57

## 2022-01-01 RX ADMIN — CISATRACURIUM BESYLATE 12.5 MICROGRAM(S)/KG/MIN: 2 INJECTION INTRAVENOUS at 01:22

## 2022-01-01 RX ADMIN — SODIUM CHLORIDE 4 MILLILITER(S): 9 INJECTION INTRAMUSCULAR; INTRAVENOUS; SUBCUTANEOUS at 05:33

## 2022-01-01 RX ADMIN — CHLORHEXIDINE GLUCONATE 1 APPLICATION(S): 213 SOLUTION TOPICAL at 02:16

## 2022-01-01 RX ADMIN — ENOXAPARIN SODIUM 60 MILLIGRAM(S): 100 INJECTION SUBCUTANEOUS at 17:26

## 2022-01-01 RX ADMIN — ENOXAPARIN SODIUM 60 MILLIGRAM(S): 100 INJECTION SUBCUTANEOUS at 21:46

## 2022-01-01 RX ADMIN — Medication 100 MILLIEQUIVALENT(S): at 16:10

## 2022-01-01 RX ADMIN — MIDODRINE HYDROCHLORIDE 5 MILLIGRAM(S): 2.5 TABLET ORAL at 21:41

## 2022-07-19 PROBLEM — Z00.00 ENCOUNTER FOR PREVENTIVE HEALTH EXAMINATION: Status: ACTIVE | Noted: 2022-01-01

## 2022-07-25 PROBLEM — N40.0 BPH (BENIGN PROSTATIC HYPERPLASIA): Status: ACTIVE | Noted: 2022-01-01

## 2022-07-25 NOTE — HISTORY OF PRESENT ILLNESS
[FreeTextEntry1] : 88y/o male with frequent urination and weak stream.\par History of BPH, in treatment with furosemide.

## 2022-07-25 NOTE — REVIEW OF SYSTEMS
[Constipation] : constipation [Heartburn] : heartburn [Strong urge to urinate] : strong urge to urinate [Difficulty Walking] : difficulty walking [Negative] : Heme/Lymph [FreeTextEntry2] : Leak Urine

## 2022-07-25 NOTE — ASSESSMENT
[FreeTextEntry1] : 89y/o male with frequent urination and weak stream.\par History of BPH, in treatment with furosemide.\par \par Prostate gland dimensions X2, regular margins, regular consistency, no nodules, no pain reported upon pressure. \par \par Prescribing Tamsulosin 0.4 1/day for 3 months.\par Will F/U in case needed.

## 2022-07-25 NOTE — PHYSICAL EXAM
[General Appearance - Well Developed] : well developed [General Appearance - Well Nourished] : well nourished [Normal Appearance] : normal appearance [Well Groomed] : well groomed [General Appearance - In No Acute Distress] : no acute distress [Edema] : no peripheral edema [Respiration, Rhythm And Depth] : normal respiratory rhythm and effort [Exaggerated Use Of Accessory Muscles For Inspiration] : no accessory muscle use [Abdomen Soft] : soft [Abdomen Tenderness] : non-tender [Costovertebral Angle Tenderness] : no ~M costovertebral angle tenderness [Urethral Meatus] : meatus normal [Urinary Bladder Findings] : the bladder was normal on palpation [Scrotum] : the scrotum was normal [Testes Mass (___cm)] : there were no testicular masses [No Prostate Nodules] : no prostate nodules [FreeTextEntry1] : Prostate gland dimensions X2, regular margins, regular consistency, no nodules, no pain reported upon pressure.  [Normal Station and Gait] : the gait and station were normal for the patient's age [] : no rash [No Focal Deficits] : no focal deficits [Oriented To Time, Place, And Person] : oriented to person, place, and time [Affect] : the affect was normal [Mood] : the mood was normal [Not Anxious] : not anxious [No Palpable Adenopathy] : no palpable adenopathy

## 2022-08-16 PROBLEM — I71.4 AAA (ABDOMINAL AORTIC ANEURYSM): Status: ACTIVE | Noted: 2022-01-01

## 2022-08-16 NOTE — DATA REVIEWED
[FreeTextEntry1] : The patient's abdominal aortic ultrasounds from last couple of years as well as the one from July 2022 is reviewed.

## 2022-08-16 NOTE — PHYSICAL EXAM
[JVD] : no jugular venous distention  [Normal Thyroid] : the thyroid was normal [Normal Heart Sounds] : normal heart sounds [Normal Rate and Rhythm] : normal rate and rhythm [Right Carotid Bruit] : no bruit heard over the right carotid [Left Carotid Bruit] : no bruit heard over the left carotid [2+] : left 2+ [Right Femoral Bruit] : no bruit heard over the right femoral artery [Left Femoral Bruit] : no bruit heard over the left femoral artery [1+] : right 1+ [0] : left 0 [Ankle Swelling (On Exam)] : present [Ankle Swelling Bilaterally] : bilaterally  [Ankle Swelling On The Left] : moderate [Varicose Veins Of Lower Extremities] : not present [] : not present [Abdomen Masses] : No abdominal masses [Tender] : nontender [Enlarged] : not enlarged [No Rash or Lesion] : No rash or lesion [Alert] : alert [Oriented to Person] : oriented to person [Oriented to Place] : oriented to place [Oriented to Time] : oriented to time [Calm] : calm [de-identified] : 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

## 2022-08-16 NOTE — ASSESSMENT
[FreeTextEntry1] : It appears that the patient has a stable abdominal aortic aneurysm sac is at size of between 10 to 11 cm with a perigraft clot and no endoleak seen in the last ultrasound performed in July 2022.  The patient is asymptomatic and with benign abdomen.

## 2022-08-16 NOTE — HISTORY OF PRESENT ILLNESS
[FreeTextEntry1] : This is a 89-year-old male who has come to the office accompanied by his doctor and was referred by Dr. Dony Almeida for follow-up of his abdominal aortic aneurysm which was repaired by Endo vascular aneurysm surgery and also had a secondary procedures done for his type II endoleak's.  All the procedures were done in [place].  The patient has moved to [place] in last few months after the death of his wife and now lives with his daughter in [facility]  The patient has been doing ultrasounds and follow-up of his abdominal aortic aneurysm surveillance.  The the last aortic ultrasound was done in July 2022.  This is reviewed and discussed with the patient's daughter and the patient.\par At this time patient has no symptoms or complaints related to aneurysm.  There is no abdominal pain there is no back pain no pain in the feet.\par Patient gets dependent edema on both feet below the ankles which gets worse as the day progresses and burst in the evening.  Patient has no symptoms related to the

## 2022-08-23 NOTE — PHYSICAL EXAM
[JVD] : no jugular venous distention  [Normal Breath Sounds] : Normal breath sounds [Normal Rate and Rhythm] : normal rate and rhythm [2+] : left 2+ [Ankle Swelling (On Exam)] : not present [Varicose Veins Of Lower Extremities] : not present [] : not present [Abdomen Tenderness] : ~T ~M No abdominal tenderness [No Rash or Lesion] : No rash or lesion [Skin Ulcer] : no ulcer [Alert] : alert [Calm] : calm [de-identified] : Appears well

## 2022-08-23 NOTE — ASSESSMENT
[FreeTextEntry1] : In the office today patient underwent aortic duplex study which shows a greater than 12 cm aortic sac.  Most recent duplex prior to this was 10 cm.  Given the increased size of the sac would recommend CT scan with possible intervention

## 2022-08-23 NOTE — HISTORY OF PRESENT ILLNESS
[FreeTextEntry1] : 89-year-old gentleman status post repair of abdominal aortic aneurysm with endograft 2014.  Post procedure patient had expansion of his sac after initial regression and was treated for type II endoleak with coil embolization.  Since that time the sac has increased in size and his most recent sonogram showed a 10 cm aortic sac.  There was no obvious endoleak.  Patient now presents for follow-up.  He has no complaints referable to back or abdominal pain.

## 2022-09-23 PROBLEM — U07.1 COVID-19 VIRUS INFECTION: Status: ACTIVE | Noted: 2022-01-01

## 2022-09-23 NOTE — REVIEW OF SYSTEMS
[Fever] : no fever [Chills] : no chills [Shortness Of Breath] : no shortness of breath [Cough] : cough [Headache] : no headache [Negative] : Genitourinary [FreeTextEntry6] : phlegm

## 2022-09-23 NOTE — PHYSICAL EXAM
[Normal] : no acute distress, well nourished, well developed and well-appearing [de-identified] : No visible respiratory distress, speaking without respiratory distress [de-identified] : No visible rash

## 2022-09-23 NOTE — HISTORY OF PRESENT ILLNESS
[Home] : at home, [unfilled] , at the time of the visit. [Medical Office: ([facility])___] : at the medical office located in  [Family Member] : family member [Verbal consent obtained from patient] : the patient, [unfilled] [FreeTextEntry8] : Positive home COVID test this AM.\par Patient's daughter, Krystal, also participating in the call.  \par Daughter and son-in-law also COVID positive. \par \par Patient has sore throat, coughing. \par Tested positive this AM. Coughing, phlegm. \par Gave mucinex DM which helped.  \par No GI symptoms\par No chest pain, no difficulty breathing \par Ate last night \par \par Received COVID vaccines and booster. \par \par Was recently hospitalized with Pneumonia, but daughter reports he is back to his baseline since returning home from hospital.  He was feeling better and then the COVID symptoms started.   \par Daughter notes he does not feel warm.

## 2022-09-23 NOTE — PLAN
[FreeTextEntry1] : Advised to monitor symptoms. \par Recommending mucinex for cough.  Saline nasal spray for sinus congestion. \par Will start Paxlovid-precautions discussed; advised daughter patient needs to hold statin and flomax while on medication and 5 days after. \par \par If develops shortness of breath, worsening respiratory symptoms needs immediate evaluation.  \par \par Discussed CDC recommendations.  Self-care, home isolation for at least 5 days, must be fever free without any acetaminophen, ibuprofen last 24 hours.  Discussed masking after ending isolation for an additional 5 days.\par \par Advised to follow-up if not improving.\par Patient and daughter expressed understanding.\par

## 2022-09-29 NOTE — H&P ADULT - HISTORY OF PRESENT ILLNESS
This is an 88 yo m with hx of BPH, htn, hld, AAA scheduled for elective repair in 10 days, presenting to the ED with daughter due to fatigue and generalized weakness x1 w, inability to ambulate (at baseline ambulates with walker and completes ADLs w/o assistance He tested COVID +4 days ago and has been on Paxil with since then. Denies f/c, sob, h/a, cp, abd pain, cough, n/v/d/c. presenting vitals stable, afebrile, O2 sat 97% on RA, however DD 7759 so CTA was done showing segmental and subsegmental L PE possibly chronic, w/o R heart strain. hgb 10, na 147, bun/creat 32/1.57 unknown baseline, pBNP 610. anticoagulation is discussed with daughter and she is unsure is she is agreeable to it.     CTA chest:   Segmental and subsegmental emboli involving the left lower lobe. Peripheral location is concerning for chronic process.  Chronic changes with possible patchy infiltrates involving the lower lobes. Prior right upper lobe resection.

## 2022-09-29 NOTE — ED PROVIDER NOTE - CLINICAL SUMMARY MEDICAL DECISION MAKING FREE TEXT BOX
89-year-old male with comorbid conditions listed above presenting to the ED with generalized weakness, presenting to the ED with daughter we will send screening labs cardiac enzymes chest x-ray, patient able to move all 4 extremities nonfocal exam.  No trauma no indications for head CT at this time, gentle IV fluid bolus given dry mucous membranes and reassess

## 2022-09-29 NOTE — ED PROVIDER NOTE - NS ED ROS FT
Constitutional: See HPI.  Eyes: No visual changes, eye pain or discharge. No Photophobia  ENMT: No neck pain or stiffness. No limited ROM  Cardiac: No SOB or edema. No chest pain with exertion.  Respiratory: see hpi   GI: No nausea, vomiting, diarrhea or abdominal pain.  : No dysuria, frequency or burning. No Discharge  MS: No myalgia, muscle weakness, joint pain or back pain.  Neuro: see hpi  Skin: No skin rash.  Except as documented in the HPI, all other systems are negative.

## 2022-09-29 NOTE — ED ADULT TRIAGE NOTE - CHIEF COMPLAINT QUOTE
Patient per Daughter: tested positive for COVID on Friday, placed on Paxlovid, not improving. Patient had pneumonia "a few weeks ago.". Congested sounding cough.  Patient reports "very weak." Appears frail. Respirations even, non labored. Improved.

## 2022-09-29 NOTE — ED PROVIDER NOTE - OBJECTIVE STATEMENT
89-year-old male with history of BPH, hyperlipidemia, hypertension, abdominal aortic aneurysm presenting to the ED with daughter for fatigue and generalized weakness for the past week, tested COVID-positive 4 days ago on Paxil with since then, patient ambulates with walker at baseline, performs ADLs as per daughter and has not been able to do much secondary to weakness.  No fever, no vomiting no diarrhea no abdominal pain

## 2022-09-29 NOTE — ED PROVIDER NOTE - PHYSICAL EXAMINATION
VITAL SIGNS: I have reviewed nursing notes and confirm.  CONSTITUTIONAL: appears stated age   SKIN: Warm dry, normal skin turgor  HEAD: NCAT  EYES: no scleral icterus  ENT: dry mucous membranes, normal pharynx s  NECK: Supple; non tender. Full ROM.   CARD: RRR, no murmurs, rubs or gallops  RESP: b/l diffuse crackles  ABD: soft, + BS, non-tender, non-distended, no rebound or guarding.  NEURO: non-focal

## 2022-09-29 NOTE — H&P ADULT - ASSESSMENT
This is an 89 yo m with hx of BPH, htn, hld, AAA scheduled for elective repair in 10 days, presenting to the ED with daughter due to fatigue and generalized weakness x1 w, inability to ambulate (at baseline ambulates with walker and completes ADLs w/o assistance He tested COVID +4 days ago and has been on Paxil with since then. Denies f/c, sob, h/a, cp, abd pain, cough, n/v/d/c. presenting vitals stable, afebrile, O2 sat 97% on RA, however DD 7759 so CTA was done showing segmental and subsegmental L PE possibly chronic, w/o R heart strain. hgb 10, na 147, bun/creat 32/1.57 unknown baseline, pBNP 610. anticoagulation is discussed with daughter and she is unsure is she is agreeable to it.     sepsis due to SARSCOV2 infection   b/l pna   acute vs chronic L PE   nadia vs ckd  hypernatremia/dehydration   impaired ambulation   aaa  htn   hld   bph  -decadron  -slight hypernatremia and nadia, likely due to dehydration and covid. will hold home lasix 40 d and give gentle IVF for now   -consult vascular to assess for AC risk/benefir in light of planned AAA repair  -tte to definitively r/o RH strain or RH thrombus  -monitor creat   -resume home meds

## 2022-09-29 NOTE — ED ADULT NURSE NOTE - CHIEF COMPLAINT QUOTE
Patient per Daughter: tested positive for COVID on Friday, placed on Paxlovid, not improving. Patient had pneumonia "a few weeks ago.". Congested sounding cough.  Patient reports "very weak." Appears frail. Respirations even, non labored.

## 2022-09-29 NOTE — ED ADULT NURSE NOTE - NSIMPLEMENTINTERV_GEN_ALL_ED
Implemented All Fall with Harm Risk Interventions:  [place] to call system. Call bell, personal items and telephone within reach. Instruct patient to call for assistance. Room bathroom lighting operational. Non-slip footwear when patient is off stretcher. Physically safe environment: no spills, clutter or unnecessary equipment. Stretcher in lowest position, wheels locked, appropriate side rails in place. Provide visual cue, wrist band, yellow gown, etc. Monitor gait and stability. Monitor for mental status changes and reorient to person, place, and time. Review medications for side effects contributing to fall risk. Reinforce activity limits and safety measures with patient and family. Provide visual clues: red socks.

## 2022-09-29 NOTE — PATIENT PROFILE ADULT - FALL HARM RISK - HARM RISK INTERVENTIONS
Assistance with ambulation/Assistance OOB with selected safe patient handling equipment/Communicate Risk of Fall with Harm to all staff/Discuss with provider need for PT consult/Monitor gait and stability/Orthostatic vital signs/Provide patient with walking aids - walker, cane, crutches/Reinforce activity limits and safety measures with patient and family/Tailored Fall Risk Interventions/Visual Cue: Yellow wristband and red socks/Bed in lowest position, wheels locked, appropriate side rails in place/Call bell, personal items and telephone in reach/Instruct patient to call for assistance before getting out of bed or chair/Non-slip footwear when patient is out of bed/[place] to call system/Physically safe environment - no spills, clutter or unnecessary equipment/Purposeful Proactive Rounding/Room/bathroom lighting operational, light cord in reach

## 2022-09-30 NOTE — PHYSICAL THERAPY INITIAL EVALUATION ADULT - PERTINENT HX OF CURRENT PROBLEM, REHAB EVAL
Pt is a 89 yo M with hx of BPH, HTN, AAA, presenting to ED for fatigue and weakness with inabilty to walk.  COVID +on paxil.

## 2022-09-30 NOTE — PROVIDER CONTACT NOTE (OTHER) - SITUATION
Pt's heart rate was 39. Pt was sleeping. Pt's heart rate now is in the 50s-60s
Pt's legs ultrasound resulted as nonocclusive thrombus in left mid femoral

## 2022-10-02 NOTE — PROGRESS NOTE ADULT - SUBJECTIVE AND OBJECTIVE BOX
CHIEF COMPLAINT/INTERVAL HISTORY:    Patient is a 89y old  Male who presents with a chief complaint of     HPI:  This is an 88 yo m with hx of BPH, htn, hld, AAA scheduled for elective repair in 10 days, presenting to the ED with daughter due to fatigue and generalized weakness x1 w, inability to ambulate (at baseline ambulates with walker and completes ADLs w/o assistance He tested COVID +4 days ago and has been on Paxil with since then. Denies f/c, sob, h/a, cp, abd pain, cough, n/v/d/c. presenting vitals stable, afebrile, O2 sat 97% on RA, however DD 7759 so CTA was done showing segmental and subsegmental L PE possibly chronic, w/o R heart strain. hgb 10, na 147, bun/creat 32/1.57 unknown baseline, pBNP 610. anticoagulation is discussed with daughter and she is unsure is she is agreeable to it.     CTA chest:   Segmental and subsegmental emboli involving the left lower lobe. Peripheral location is concerning for chronic process.  Chronic changes with possible patchy infiltrates involving the lower lobes. Prior right upper lobe resection.         (29 Sep 2022 20:41)      SUBJECTIVE & OBJECTIVE: Pt seen and examined at bedside.   offers no complaints  feels well     Vital Signs Last 24 Hrs  T(C): 36.2 (01 Oct 2022 10:47), Max: 36.3 (30 Sep 2022 16:21)  T(F): 97.1 (01 Oct 2022 10:47), Max: 97.3 (30 Sep 2022 16:21)  HR: 65 (01 Oct 2022 10:47) (56 - 67)  BP: 98/62 (01 Oct 2022 10:47) (98/62 - 107/60)  BP(mean): --  ABP: --  ABP(mean): --  RR: 17 (01 Oct 2022 10:47) (17 - 18)  SpO2: 96% (01 Oct 2022 10:47) (95% - 98%)    O2 Parameters below as of 01 Oct 2022 10:47  Patient On (Oxygen Delivery Method): room air              MEDICATIONS  (STANDING):  atorvastatin 20 milliGRAM(s) Oral at bedtime  enoxaparin Injectable 60 milliGRAM(s) SubCutaneous every 12 hours  tamsulosin 0.4 milliGRAM(s) Oral at bedtime    MEDICATIONS  (PRN):  acetaminophen     Tablet .. 650 milliGRAM(s) Oral every 6 hours PRN Temp greater or equal to 38C (100.4F), Mild Pain (1 - 3)  aluminum hydroxide/magnesium hydroxide/simethicone Suspension 30 milliLiter(s) Oral every 4 hours PRN Dyspepsia  melatonin 3 milliGRAM(s) Oral at bedtime PRN Insomnia  ondansetron Injectable 4 milliGRAM(s) IV Push every 8 hours PRN Nausea and/or Vomiting        PHYSICAL EXAM:    GENERAL: NAD, well-developed  HEAD:  Atraumatic, Normocephalic  EYES: EOMI, PERRLA, conjunctiva and sclera clear  ENMT: Moist mucous membranes  NECK: Supple  NERVOUS SYSTEM:  Alert & Oriented X3, normal speech   CHEST/LUNG: Clear to auscultation bilaterally; No rales, rhonchi, wheezing, or rubs  HEART: S1, S2  ABDOMEN: Soft, Nontender, Nondistended; Bowel sounds present  EXTREMITIES:no cyanosis, or edema    LABS:                        9.2    6.63  )-----------( 228      ( 01 Oct 2022 07:20 )             29.8     10-01    147<H>  |  113<H>  |  34<H>  ----------------------------<  123<H>  3.9   |  28  |  1.10    Ca    8.6      01 Oct 2022 07:20    TPro  5.7<L>  /  Alb  2.3<L>  /  TBili  0.3  /  DBili  x   /  AST  22  /  ALT  24  /  AlkPhos  70  10-01      
Patient is a 89y old  Male who presents with a chief complaint of Wkness.    INTERVAL HPI/OVERNIGHT EVENTS:  Pt was seen and examined, no acute events.    MEDICATIONS  (STANDING):  atorvastatin 20 milliGRAM(s) Oral at bedtime  enoxaparin Injectable 60 milliGRAM(s) SubCutaneous every 12 hours  tamsulosin 0.4 milliGRAM(s) Oral at bedtime    MEDICATIONS  (PRN):  acetaminophen     Tablet .. 650 milliGRAM(s) Oral every 6 hours PRN Temp greater or equal to 38C (100.4F), Mild Pain (1 - 3)  aluminum hydroxide/magnesium hydroxide/simethicone Suspension 30 milliLiter(s) Oral every 4 hours PRN Dyspepsia  melatonin 3 milliGRAM(s) Oral at bedtime PRN Insomnia  ondansetron Injectable 4 milliGRAM(s) IV Push every 8 hours PRN Nausea and/or Vomiting      Allergies  No Known Allergies        Vital Signs Last 24 Hrs  T(C): 36.9 (02 Oct 2022 17:01), Max: 36.9 (02 Oct 2022 17:01)  T(F): 98.4 (02 Oct 2022 17:01), Max: 98.4 (02 Oct 2022 17:01)  HR: 78 (02 Oct 2022 17:01) (50 - 78)  BP: 120/76 (02 Oct 2022 17:01) (103/62 - 124/70)  BP(mean): --  RR: 18 (02 Oct 2022 17:01) (17 - 18)  SpO2: 95% (02 Oct 2022 17:01) (91% - 96%)    Parameters below as of 02 Oct 2022 17:01  Patient On (Oxygen Delivery Method): room air      PHYSICAL EXAM:  GENERAL: NAD, well-developed  HEAD:  Atraumatic, Normocephalic  EYES: EOMI, PERRLA, conjunctiva and sclera clear  ENMT: Moist mucous membranes  NECK: Supple  NERVOUS SYSTEM:  Alert & Oriented X3, normal speech   CHEST/LUNG: Diminished, coughing, RA  HEART: S1, S2  ABDOMEN: Soft, Nontender, Nondistended; Bowel sounds present  EXTREMITIES: no cyanosis, or edema      LABS:                        9.4    5.09  )-----------( 233      ( 02 Oct 2022 08:25 )             30.3     10-02    147<H>  |  114<H>  |  31<H>  ----------------------------<  88  3.8   |  30  |  1.05    Ca    8.4<L>      02 Oct 2022 08:25    TPro  5.7<L>  /  Alb  2.3<L>  /  TBili  0.3  /  DBili  x   /  AST  22  /  ALT  24  /  AlkPhos  70  10-01        CAPILLARY BLOOD GLUCOSE          RADIOLOGY & ADDITIONAL TESTS:    Imaging Personally Reviewed:  [ ] YES  [ ] NO    Consultant(s) Notes Reviewed:  [ ] YES  [ ] NO    Care Discussed with Consultants/Other Providers [ ] YES  [ ] NO  
Patient is a 89y old  Male who presents with a chief complaint of wkness.    INTERVAL HPI/OVERNIGHT EVENTS:  Pt was seen and examined, no acute events.    MEDICATIONS  (STANDING):  atorvastatin 20 milliGRAM(s) Oral at bedtime  dexAMETHasone  Injectable 6 milliGRAM(s) IV Push daily  enoxaparin Injectable 60 milliGRAM(s) SubCutaneous every 12 hours  sodium chloride 0.9%. 1000 milliLiter(s) (75 mL/Hr) IV Continuous <Continuous>  tamsulosin 0.4 milliGRAM(s) Oral at bedtime    MEDICATIONS  (PRN):  acetaminophen     Tablet .. 650 milliGRAM(s) Oral every 6 hours PRN Temp greater or equal to 38C (100.4F), Mild Pain (1 - 3)  aluminum hydroxide/magnesium hydroxide/simethicone Suspension 30 milliLiter(s) Oral every 4 hours PRN Dyspepsia  melatonin 3 milliGRAM(s) Oral at bedtime PRN Insomnia  ondansetron Injectable 4 milliGRAM(s) IV Push every 8 hours PRN Nausea and/or Vomiting      Allergies  No Known Allergies      Vital Signs Last 24 Hrs  T(C): 36.3 (30 Sep 2022 10:37), Max: 37 (29 Sep 2022 20:15)  T(F): 97.3 (30 Sep 2022 10:37), Max: 98.6 (29 Sep 2022 20:15)  HR: 74 (30 Sep 2022 10:37) (56 - 78)  BP: 100/65 (30 Sep 2022 10:37) (100/65 - 124/63)  BP(mean): --  RR: 17 (30 Sep 2022 10:37) (17 - 20)  SpO2: 96% (30 Sep 2022 10:37) (94% - 98%)    Parameters below as of 30 Sep 2022 10:37  Patient On (Oxygen Delivery Method): room air        PHYSICAL EXAM:  GENERAL: NAD  HEAD:  Atraumatic  EYES: PERRLA  ENMT: Mouth moist   NECK: Supple  NERVOUS SYSTEM:  Awake, alert, non focal  CHEST/LUNG: Diminished   HEART: RRR, S1, S2  ABDOMEN: Soft, non tender  EXTREMITIES:  no edema B/L  SKIN: No rash        LABS:                        9.4    2.93  )-----------( 191      ( 30 Sep 2022 07:18 )             30.5     09-30    144  |  109<H>  |  29<H>  ----------------------------<  163<H>  3.8   |  30  |  1.15    Ca    8.4<L>      30 Sep 2022 07:18    TPro  5.9<L>  /  Alb  2.3<L>  /  TBili  0.4  /  DBili  x   /  AST  19  /  ALT  18  /  AlkPhos  73  09-30      CAPILLARY BLOOD GLUCOSE      RADIOLOGY & ADDITIONAL TESTS:    Imaging Personally Reviewed:  [ ] YES  [ ] NO    Consultant(s) Notes Reviewed:  [ ] YES  [ ] NO    Care Discussed with Consultants/Other Providers [ ] YES  [ ] NO

## 2022-10-02 NOTE — CONSULT NOTE ADULT - SUBJECTIVE AND OBJECTIVE BOX
CHIEF COMPLAINT:  Patient is a 89y old  Male who presents with a chief complaint of     HPI:  This is an 89 yo m with hx of BPH, htn, hld, AAA scheduled for elective repair in 10 days, presenting to the ED with daughter due to fatigue and generalized weakness x1 w, inability to ambulate (at baseline ambulates with walker and completes ADLs w/o assistance He tested COVID +4 days ago and has been on Paxil with since then. Denies f/c, sob, h/a, cp, abd pain, cough, n/v/d/c. presenting vitals stable, afebrile, O2 sat 97% on RA, however DD [id number] so CTA was done showing segmental and subsegmental L PE possibly chronic, w/o R heart strain. hgb 10, na 147, bun/creat 32/1.57 unknown baseline, pBNP 610. anticoagulation is discussed with daughter and she is unsure is she is agreeable to it.     CTA chest:   Segmental and subsegmental emboli involving the left lower lobe. Peripheral location is concerning for chronic process.  Chronic changes with possible patchy infiltrates involving the lower lobes. Prior right upper lobe resection.        ALLERGIES:  No Known Allergies    Home Medications:  Lasix 40 mg oral tablet: 1 tab(s) orally once a day (29 Sep 2022 12:52)  Lipitor 20 mg oral tablet: 1 tab(s) orally once a day (29 Sep 2022 12:52)  tamsulosin 0.4 mg oral capsule: 1 cap(s) orally once a day (29 Sep 2022 12:52)    PAST MEDICAL & SURGICAL HISTORY:  AAA (abdominal aortic aneurysm)      Acute CHF  chf      Chronic systolic congestive heart failure        FAMILY HISTORY:  No pertinent family history in first degree relatives        SOCIAL HISTORY:    REVIEW OF SYSTEMS:  General:  No wt loss, fevers, chills, night sweats  Eyes:  Good vision, no reported pain  ENT:  No sore throat, pain, runny nose, dysphagia  CV:  No pain, palpitations, hypo/hypertension  Resp:  No dyspnea, cough, tachypnea, wheezing  GI:  No pain, nausea, vomiting, diarrhea, constipation  :  No pain, bleeding, incontinence, nocturia  Muscle:  No pain, weakness  Breast:  No pain, abscess, mass, discharge  Neuro:  No weakness, tingling, memory problems  Psych:  No fatigue, insomnia, mood problems, depression  Endocrine:  No polyuria, polydipsia, cold/heat intolerance  Heme:  No petechiae, ecchymosis, easy bruisability  Skin:  No rash, edema    PHYSICAL EXAM:  Vital Signs:  Vital Signs Last 24 Hrs  T(C): 36.9 (02 Oct 2022 17:01), Max: 36.9 (02 Oct 2022 17:01)  T(F): 98.4 (02 Oct 2022 17:01), Max: 98.4 (02 Oct 2022 17:01)  HR: 78 (02 Oct 2022 17:01) (50 - 78)  BP: 120/76 (02 Oct 2022 17:01) (103/62 - 124/70)  RR: 18 (02 Oct 2022 17:01) (17 - 18)  SpO2: 95% (02 Oct 2022 17:01) (91% - 96%)    Parameters below as of 02 Oct 2022 17:01  Patient On (Oxygen Delivery Method): room air      I&O's Summary    01 Oct 2022 07:01  -  02 Oct 2022 07:00  --------------------------------------------------------  IN: 360 mL / OUT: 0 mL / NET: 360 mL    02 Oct 2022 07:01  -  02 Oct 2022 17:24  --------------------------------------------------------  IN: 420 mL / OUT: 0 mL / NET: 420 mL      I&O's Detail    01 Oct 2022 07:01  -  02 Oct 2022 07:00  --------------------------------------------------------  IN:    Oral Fluid: 360 mL  Total IN: 360 mL    OUT:  Total OUT: 0 mL    Total NET: 360 mL      02 Oct 2022 07:01  -  02 Oct 2022 17:24  --------------------------------------------------------  IN:    Oral Fluid: 420 mL  Total IN: 420 mL    OUT:  Total OUT: 0 mL    Total NET: 420 mL      Tele:     Constitutional: well developed, normal appearance, well groomed, well nourished, no deformities and no acute distress.   Eyes: the conjunctiva exhibited no abnormalities and the eyelids demonstrated no xanthelasmas.   HEENT: normal oral mucosa, no oral pallor and no oral cyanosis.   Neck: normal jugular venous A waves present, normal jugular venous V waves present and no jugular venous schultz A waves.   Pulmonary: no respiratory distress, normal respiratory rhythm and effort, no accessory muscle use and lungs were clear to auscultation bilaterally.   Cardiovascular: heart rate and rhythm were normal, normal S1 and S2 and no murmur, gallop, rub, heave or thrill are present.   Abdomen: soft, non-tender, no hepato-splenomegaly and no abdominal mass palpated.   Musculoskeletal: the gait could not be assessed..   Extremities: no clubbing of the fingernails, no localized cyanosis, no petechial hemorrhages and no ischemic changes.   Skin: normal skin color and pigmentation, no rash, no venous stasis, no skin lesions, no skin ulcer and no xanthoma was observed.   Psychiatric: oriented to person, place, and time, the affect was normal, the mood was normal and not feeling anxious.      LABORATORY:                          9.4    5.09  )-----------( 233      ( 02 Oct 2022 08:25 )             30.3     10-02    147<H>  |  114<H>  |  31<H>  ----------------------------<  88  3.8   |  30  |  1.05    Ca    8.4<L>      02 Oct 2022 08:25    TPro  5.7<L>  /  Alb  2.3<L>  /  TBili  0.3  /  DBili  x   /  AST  22  /  ALT  24  /  AlkPhos  70  10-01        LIVER FUNCTIONS - ( 01 Oct 2022 07:20 )  Alb: 2.3 g/dL / Pro: 5.7 gm/dL / ALK PHOS: 70 U/L / ALT: 24 U/L / AST: 22 U/L / GGT: x               IMAGING:  < from: 12 Lead ECG (09.29.22 @ 18:29) >  Sinus rhythm withpremature atrial complexes  Left axis deviation  Right bundle branch block  Abnormal ECG  No previous ECGs available    < end of copied text >    < from: CT Angio Chest PE Protocol w/ IV Cont (09.29.22 @ 16:12) >    ACC: [id number] EXAM:  CT ANGIO CHEST PULM [facility]                          PROCEDURE DATE:  09/29/2022          INTERPRETATION:  CLINICAL INFORMATION: Covid positive. Shortness of   breath. Evaluate for PE    COMPARISON: None.    CONTRAST/COMPLICATIONS:  IV Contrast: Omnipaque 350  85 cc administered   15 cc discarded  Oral Contrast: NONE  Complications: None reported at time of study completion    PROCEDURE:  CT Angiography of the Chest.  Sagittal and coronal reformats were performed as well as 3D (MIP)   reconstructions.    FINDINGS:    LUNGS AND AIRWAYS: Patent central airways. Bronchial plugging involving   the lower lobes.  Prior right upper lobe resection. Biapical scarring. A   combination of chronic disease with associated bronchiectasis and   dependent lung atelectasis involving the lower lobes bilaterally. Patchy   opacities involving the lower lobes, predominantly right-sided may   represent pneumonia in view of clinical history.  PLEURA: Trace right pleural effusion.  MEDIASTINUM AND JAKE: No lymphadenopathy.  VESSELS: Evidence of peripheral filling defects involving the segmental   and subsegmental branches of left lower lobe pulmonary artery. Peripheral   location is concerning for chronicity. 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 Coronary artery calcification.  HEART: Heart size is normal. No pericardial effusion.  CHEST WALL AND LOWER NECK: Within normal limits.  VISUALIZED UPPER ABDOMEN: Stable small hypodense focus subdiaphragmatic   right and a liver, indeterminate. Partially delineated aortic stent graft   and excluded aneurysm sac. Possible combination of hydronephrosis and   cysts involving the left kidney. 2 mm sized nonobstructing calculus mid   left kidney.  BONES: Degenerative changes    IMPRESSION:  Segmental and subsegmental emboli involving the left lower lobe.   Peripheral location is concerning for chronic process.    Chronic changes with possible patchy infiltrates involving the lower   lobes.  Prior right upper lobe resection.  Additional findings as above.    < end of copied text >    < from: TTE Echo Complete w/o Contrast w/ Doppler (10.02.22 @ 13:38) >  Summary:   1. Left ventricular ejection fraction, by visual estimation, is 60 to   65%.   2. Technically adequate study.   3. Normal global left ventricular systolic function.   4. Normal left ventricular internal cavity size.   5. Spectral Doppler shows impaired relaxation pattern of left   ventricular myocardial filling (Grade I diastolic dysfunction).   6. Normal right ventricular size and function.   7. Normal left atrial size.   8. Normal right atrial size.   9. There is no evidence of pericardial effusion.  10. Mild mitral valve regurgitation.  11. Mild thickening and calcification of the anterior and posterior   mitral valve leaflets.  12. Mild tricuspid regurgitation.  13. Moderate aortic regurgitation.  14. Normal trileaflet aortic valve with normal opening.  15. Sclerotic aortic valve with normal opening.  16. Mild pulmonic valve regurgitation.  17. Increased relative wall thickness with normal mass index consistent   with left ventricular concentric remodeling.  18. Multi-lobulated liver cyst seen. Consider dedicated abdominal study   to further assess.    < end of copied text >    ASSESSMENT:  This is an 89 yo m with hx of BPH, htn, hld, AAA scheduled for elective repair in 10 days, presenting to the ED with daughter due to fatigue and generalized weakness x1 w, inability to ambulate (at baseline ambulates with walker and completes ADLs w/o assistance He tested COVID +4 days ago and has been on Paxil with since then. Denies f/c, sob, h/a, cp, abd pain, cough, n/v/d/c. presenting vitals stable, afebrile, O2 sat 97% on RA, however DD 7759 so CTA was done showing segmental and subsegmental L PE possibly chronic, w/o R heart strain. hgb 10, na 147, bun/creat 32/1.57 unknown baseline, pBNP 610. anticoagulation is discussed with daughter and she is unsure is she is agreeable to it.     CTA chest:   Segmental and subsegmental emboli involving the left lower lobe. Peripheral location is concerning for chronic process.  Chronic changes with possible patchy infiltrates involving the lower lobes. Prior right upper lobe resection.      PLAN:       atorvastatin 20 milliGRAM(s) Oral at bedtime  enoxaparin Injectable 60 milliGRAM(s) SubCutaneous every 12 hours  tamsulosin 0.4 milliGRAM(s) Oral at bedtime    prelim note    Joey Suazo MD, FACC, FASE, FASNC, FACP  Director, Heart Failure Services  [facility]  , Department of Cardiology  [facility]     CHIEF COMPLAINT:  Patient is a 89y old  Male who presents with a chief complaint of COVID-19 pneumonia    HPI:  Silvio is a pleasant 89-year-old with dyslipidemia, hypertension, history of a AAA repair with a stent graft in [place] and apparently he is scheduled for an outpatient elective repair at [facility] but unfortunately his course was complicated with generalized weakness and fatigue along with poor ambulation and difficulty with his ADLs.  He was diagnosed with COVID approximately 4 days prior to admission and has been having cough and shortness of breath.  CT scan of his chest reveals segmental and subsegmental pulmonary embolus unclear if it is acute or chronic.  No obvious right heart strain noted.  He is seen resting comfortably in bed in no immediate distress although he does cough significantly and has difficulty completing sentences.  No obvious chest pains palpitations, syncope or worsening edema.        ALLERGIES:  No Known Allergies    Home Medications:  Lasix 40 mg oral tablet: 1 tab(s) orally once a day (29 Sep 2022 12:52)  Lipitor 20 mg oral tablet: 1 tab(s) orally once a day (29 Sep 2022 12:52)  tamsulosin 0.4 mg oral capsule: 1 cap(s) orally once a day (29 Sep 2022 12:52)    PAST MEDICAL & SURGICAL HISTORY:  AAA (abdominal aortic aneurysm)      Acute CHF  chf      Chronic systolic congestive heart failure        FAMILY HISTORY:  No pertinent family history in first degree relatives        SOCIAL HISTORY:  Denied tobacco smoking    REVIEW OF SYSTEMS:  General:  No wt loss, fevers, chills, night sweats  Eyes:  Good vision, no reported pain  ENT:  No sore throat, pain, runny nose, dysphagia  CV:  No pain, palpitations, hypo/hypertension  Resp:  No dyspnea, cough, tachypnea, wheezing  GI:  No pain, nausea, vomiting, diarrhea, constipation  :  No pain, bleeding, incontinence, nocturia  Muscle:  No pain, weakness  Breast:  No pain, abscess, mass, discharge  Neuro:  No weakness, tingling, memory problems  Psych:  No fatigue, insomnia, mood problems, depression  Endocrine:  No polyuria, polydipsia, cold/heat intolerance  Heme:  No petechiae, ecchymosis, easy bruisability  Skin:  No rash, edema    PHYSICAL EXAM:  Vital Signs:  Vital Signs Last 24 Hrs  T(C): 36.9 (02 Oct 2022 17:01), Max: 36.9 (02 Oct 2022 17:01)  T(F): 98.4 (02 Oct 2022 17:01), Max: 98.4 (02 Oct 2022 17:01)  HR: 78 (02 Oct 2022 17:01) (50 - 78)  BP: 120/76 (02 Oct 2022 17:01) (103/62 - 124/70)  RR: 18 (02 Oct 2022 17:01) (17 - 18)  SpO2: 95% (02 Oct 2022 17:01) (91% - 96%)    Parameters below as of 02 Oct 2022 17:01  Patient On (Oxygen Delivery Method): room air      I&O's Summary    01 Oct 2022 07:01  -  02 Oct 2022 07:00  --------------------------------------------------------  IN: 360 mL / OUT: 0 mL / NET: 360 mL    02 Oct 2022 07:01  -  02 Oct 2022 17:24  --------------------------------------------------------  IN: 420 mL / OUT: 0 mL / NET: 420 mL      I&O's Detail    01 Oct 2022 07:01  -  02 Oct 2022 07:00  --------------------------------------------------------  IN:    Oral Fluid: 360 mL  Total IN: 360 mL    OUT:  Total OUT: 0 mL    Total NET: 360 mL      02 Oct 2022 07:01  -  02 Oct 2022 17:24  --------------------------------------------------------  IN:    Oral Fluid: 420 mL  Total IN: 420 mL    OUT:  Total OUT: 0 mL    Total NET: 420 mL      Tele: Sinus rhythm without ectopy noted on telemetry    Constitutional: well developed, normal appearance, well groomed, well nourished, no deformities and no acute distress.   Eyes: the conjunctiva exhibited no abnormalities and the eyelids demonstrated no xanthelasmas.   HEENT: normal oral mucosa, no oral pallor and no oral cyanosis.   Neck: normal jugular venous A waves present, normal jugular venous V waves present and no jugular venous schultz A waves.   Pulmonary: no respiratory distress, normal respiratory rhythm and effort, no accessory muscle use and lungs were clear to auscultation bilaterally.   Cardiovascular: heart rate and rhythm were normal, normal S1 and S2 and no murmur, gallop, rub, heave or thrill are present.   Abdomen: soft, non-tender, no hepato-splenomegaly and no abdominal mass palpated.   Musculoskeletal: the gait could not be assessed.  Extremities: no clubbing of the fingernails, no localized cyanosis, no petechial hemorrhages and no ischemic changes.   Skin: normal skin color and pigmentation, no rash, no venous stasis, no skin lesions, no skin ulcer and no xanthoma was observed.   Psychiatric: oriented to person, place, and time, the affect was normal, the mood was normal and not feeling anxious.      LABORATORY:                          9.4    5.09  )-----------( 233      ( 02 Oct 2022 08:25 )             30.3     10-02    147<H>  |  114<H>  |  31<H>  ----------------------------<  88  3.8   |  30  |  1.05    Ca    8.4<L>      02 Oct 2022 08:25    TPro  5.7<L>  /  Alb  2.3<L>  /  TBili  0.3  /  DBili  x   /  AST  22  /  ALT  24  /  AlkPhos  70  10-01        LIVER FUNCTIONS - ( 01 Oct 2022 07:20 )  Alb: 2.3 g/dL / Pro: 5.7 gm/dL / ALK PHOS: 70 U/L / ALT: 24 U/L / AST: 22 U/L / GGT: x               IMAGING:  < from: 12 Lead ECG (09.29.22 @ 18:29) >  Sinus rhythm withpremature atrial complexes  Left axis deviation  Right bundle branch block  Abnormal ECG  No previous ECGs available    < end of copied text >    < from: CT Angio Chest PE Protocol w/ IV Cont (09.29.22 @ 16:12) >    ACC: [id number] EXAM:  CT ANGIO CHEST PULM [facility]                          PROCEDURE DATE:  09/29/2022          INTERPRETATION:  CLINICAL INFORMATION: Covid positive. Shortness of   breath. Evaluate for PE    COMPARISON: None.    CONTRAST/COMPLICATIONS:  IV Contrast: Omnipaque 350  85 cc administered   15 cc discarded  Oral Contrast: NONE  Complications: None reported at time of study completion    PROCEDURE:  CT Angiography of the Chest.  Sagittal and coronal reformats were performed as well as 3D (MIP)   reconstructions.    FINDINGS:    LUNGS AND AIRWAYS: Patent central airways. Bronchial plugging involving   the lower lobes.  Prior right upper lobe resection. Biapical scarring. A   combination of chronic disease with associated bronchiectasis and   dependent lung atelectasis involving the lower lobes bilaterally. Patchy   opacities involving the lower lobes, predominantly right-sided may   represent pneumonia in view of clinical history.  PLEURA: Trace right pleural effusion.  MEDIASTINUM AND JAKE: No lymphadenopathy.  VESSELS: Evidence of peripheral filling defects involving the segmental   and subsegmental branches of left lower lobe pulmonary artery. Peripheral   location is concerning for chronicity. 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 Coronary artery calcification.  HEART: Heart size is normal. No pericardial effusion.  CHEST WALL AND LOWER NECK: Within normal limits.  VISUALIZED UPPER ABDOMEN: Stable small hypodense focus subdiaphragmatic   right and a liver, indeterminate. Partially delineated aortic stent graft   and excluded aneurysm sac. Possible combination of hydronephrosis and   cysts involving the left kidney. 2 mm sized nonobstructing calculus mid   left kidney.  BONES: Degenerative changes    IMPRESSION:  Segmental and subsegmental emboli involving the left lower lobe.   Peripheral location is concerning for chronic process.    Chronic changes with possible patchy infiltrates involving the lower   lobes.  Prior right upper lobe resection.  Additional findings as above.    < end of copied text >    < from: TTE Echo Complete w/o Contrast w/ Doppler (10.02.22 @ 13:38) >  Summary:   1. Left ventricular ejection fraction, by visual estimation, is 60 to   65%.   2. Technically adequate study.   3. Normal global left ventricular systolic function.   4. Normal left ventricular internal cavity size.   5. Spectral Doppler shows impaired relaxation pattern of left   ventricular myocardial filling (Grade I diastolic dysfunction).   6. Normal right ventricular size and function.   7. Normal left atrial size.   8. Normal right atrial size.   9. There is no evidence of pericardial effusion.  10. Mild mitral valve regurgitation.  11. Mild thickening and calcification of the anterior and posterior   mitral valve leaflets.  12. Mild tricuspid regurgitation.  13. Moderate aortic regurgitation.  14. Normal trileaflet aortic valve with normal opening.  15. Sclerotic aortic valve with normal opening.  16. Mild pulmonic valve regurgitation.  17. Increased relative wall thickness with normal mass index consistent   with left ventricular concentric remodeling.  18. Multi-lobulated liver cyst seen. Consider dedicated abdominal study   to further assess.    < end of copied text >    ASSESSMENT:  Silvio is a pleasant 89-year-old with dyslipidemia, hypertension, history of a AAA repair with a stent graft in [place] and apparently he is scheduled for an outpatient elective repair at [facility] but unfortunately his course was complicated with generalized weakness and fatigue along with poor ambulation and difficulty with his ADLs.  He was diagnosed with COVID approximately 4 days prior to admission and has been having cough and shortness of breath.  CT scan of his chest reveals segmental and subsegmental pulmonary embolus unclear if it is acute or chronic.  No obvious right heart strain noted.  He is seen resting comfortably in bed in no immediate distress although he does cough significantly and has difficulty completing sentences.  No obvious chest pains palpitations, syncope or worsening edema.    PLAN:       Continue atorvastatin for lipid lowering and vascular management.  Continue full dosing Lovenox for pulmonary emboli.  COVID management as per internal medicine and consideration for infectious disease consultation suggested.  Continue continuous pulse oximetry, telemetry monitoring and supplemental oxygen support.  Unknown if the patient was placed on Paxlovid as he is unclear on medications prior to admission.  His EF is preserved on echo which was performed today revealing EF 60 to 65% with grade 1 diastolic dysfunction and moderate aortic insufficiency noted.  Of particular note, there was a multi lobar liver cyst visualized.  Based on his current clinical and cardiac status, he is currently not fit to proceed ahead with elective AAA repair and would need full recovery from COVID pneumonia and pulmonary embolus treatment first which I explained to the patient.    Joey Suazo MD, FACC, FASVAL, FASNC, FACP  Director, Heart Failure Services  [facility]  , Department of Cardiology  [facility]

## 2022-10-02 NOTE — PROGRESS NOTE ADULT - PROBLEM SELECTOR PROBLEM 4
Pulmonary embolism associated with COVID-19

## 2022-10-02 NOTE — PROGRESS NOTE ADULT - ASSESSMENT
This is an 88 yo m with hx of BPH, htn, hld, AAA scheduled for elective repair in 10 days, presenting to the ED with daughter due to fatigue and generalized weakness x1 w, inability to ambulate (at baseline ambulates with walker and completes ADLs w/o assistance He tested COVID +4 days ago. afebrile, O2 sat 97% on RA, however DD 7759 so CTA was done showing segmental and subsegmental L PE possibly chronic, w/o R heart strain.       COVID PNA:  - O2 sats normal on RA  - No role of rem/ dex  - Supportive treatment    Acute vs chronic L PE / DVT acute L LE  - full dose lovenox    AAA:  - S/p abdominal aortic aneurysm with endograft 2014. Post procedure patient had expansion of his sac after initial regression and was treated for type II endoleak with coil embolization. Since that time the sac has increased in size  - Most recent aortic duplex study in 8/23 which shows a greater than 12 cm aortic sac.  - Pt is scheduled for Sx in 10 days  - Discussed with Dr. Hoffman, Dr. JUN Virgen msg left for pt's vascular sx outside Dr. Longoria.     SOBIA:  - Likely pre renal  - Improved after IVF    Hypernatremia:  - Lasix held, monitor    Impaired ambulation:  - PT     HTN:  - BP borderline low  - Not on meds       HLD:  - Continue statin    will likely need to learn to self inject lovenox if surgery still on 10/10 then lovenox will preferred than DOAc and then he could be switch to doac post op if no contra indication from surgeon. will FU with primary surgeon on Monday. 
This is an 88 yo m with hx of BPH, htn, hld, AAA scheduled for elective repair in 10 days, presenting to the ED with daughter due to fatigue and generalized weakness x1 w, inability to ambulate (at baseline ambulates with walker and completes ADLs w/o assistance He tested COVID +4 days ago and has been on Paxil with since then. Denies f/c, sob, h/a, cp, abd pain, cough, n/v/d/c. presenting vitals stable, afebrile, O2 sat 97% on RA, however DD 7759 so CTA was done showing segmental and subsegmental L PE possibly chronic, w/o R heart strain.       COVID PNA:  - SO2 ok in RA  - No role of rem/ dex  - Supportive treatment    Acute vs chronic L PE   - Start Loveox  - Discussed with Dr. Loredo, called and left msg for Dr. Longoria who is his outside vascular sx    AAA:  - S/p abdominal aortic aneurysm with endograft 2014. Post procedure patient had expansion of his sac after initial regression and was treated for type II endoleak with coil embolization. Since that time the sac has increased in size  - Most recent aortic duplex study in 8/23 which shows a greater than 12 cm aortic sac.  - Pt is scheduled for Sx in 10 days  - Discussed with Dr. Hoffman, msg left for pt's vascular sx outside Dr. Longoria.     SOBIA:  - Likely pre renal  - Improved after IVF    Hypernatremia:  - Improved   - Lasix held    Impaired ambulation:  - PT     HTN:  - BP borderline low  - Not on meds       HLD:  - Continue statin    Discussed with daughter @ [phone] K[place]   
This is an 88 yo m with hx of BPH, htn, hld, AAA scheduled for elective repair in 10 days, presenting to the ED with daughter due to fatigue and generalized weakness x1 w, inability to ambulate (at baseline ambulates with walker and completes ADLs w/o assistance He tested COVID +4 days ago. afebrile, O2 sat 97% on RA, however DD 7759 so CTA was done showing segmental and subsegmental L PE possibly chronic, w/o R heart strain.       COVID PNA:  - O2 sats normal on RA  - No role of rem/ dex  - Supportive treatment  - Add Tessalon Pearle    Acute vs chronic L PE / DVT acute L LE  - full dose Lovenox switch to DOAC on dc    AAA:  - S/p abdominal aortic aneurysm with endograft 2014. Post procedure patient had expansion of his sac after initial regression and was treated for type II endoleak with coil embolization. Since that time the sac has increased in size  - Most recent aortic duplex study in 8/23 which shows a greater than 12 cm aortic sac.  - Pt is scheduled for Sx in 10 days  - Discussed with Dr. Hoffman,  left for pt's vascular sx outside Dr. Longoria.   - Follow up Echo, vascular asked for cardio clearance.    SOBIA:  - Likely pre renal  - Improved after IVF    Hypernatremia:  - Lasix held, monitor    Impaired ambulation:  - PT     HTN:  - BP overall stable  - Not on meds       HLD:  - Continue statin    will likely need to learn to self inject lovenox if surgery still on 10/10 then lovenox will preferred than DOAc and then he could be switch to doac post op if no contra indication from surgeon. will FU with primary surgeon on Monday.

## 2022-10-03 NOTE — DISCHARGE NOTE NURSING/CASE MANAGEMENT/SOCIAL WORK - NSDPDISTO_GEN_ALL_CORE
Message left for the patient to return our phone call, M- F, 8am to 5 pm , may schedule an appointment for f/u hip pain   Home

## 2022-10-03 NOTE — DISCHARGE NOTE PROVIDER - NSDCCPCAREPLAN_GEN_ALL_CORE_FT
PRINCIPAL DISCHARGE DIAGNOSIS  Diagnosis: Weakness  Assessment and Plan of Treatment: This is an 89 yo m with hx of BPH, htn, hld, AAA scheduled for elective repair in 10 days, presenting to the ED with daughter due to fatigue and generalized weakness x1 w, inability to ambulate (at baseline ambulates with walker and completes ADLs w/o assistance He tested COVID +4 days ago. afebrile, O2 sat 97% on RA, however DD 7759 so CTA was done showing segmental and subsegmental L PE possibly chronic, w/o R heart strain.   COVID PNA:  - O2 sats normal on RA  - No role of rem/ dex  - Supportive treatment  - PRN Tessalon Pearle  Acute vs chronic L PE / DVT acute L LE  - full dose Lovenox switch to DOAC on dc, as per vascular will give lower dose Eliquis   AAA:  - S/p abdominal aortic aneurysm with endograft 2014. Post procedure patient had expansion of his sac after initial regression and was treated for type II endoleak with coil embolization. Since that time the sac has increased in size  - Most recent aortic duplex study in 8/23 which shows a greater than 12 cm aortic sac.  - Pt was scheduled for Sx that was cancelled for now  - Discussed with Dr. Hoffman,  - 2D Echo: His EF is preserved on echo which was performed today revealing EF 60 to 65% with grade 1 diastolic dysfunction and moderate aortic insufficiency noted  - Seen by cardio, not cleared by sx now, will need outpt follow up  SOBIA:  - Likely pre renal  - Improved after IVF  Hypernatremia:  - Lasix held, stable  LIver cyst seen in CT:  - Outpt follow up with PCP      SECONDARY DISCHARGE DIAGNOSES  Diagnosis: Pulmonary emboli  Assessment and Plan of Treatment:

## 2022-10-03 NOTE — DISCHARGE NOTE NURSING/CASE MANAGEMENT/SOCIAL WORK - PATIENT PORTAL LINK FT
You can access the FollowMyHealth Patient Portal offered by [facility] by registering at the following website: [email or web address]. By joining [facility]’s FollowMyHealth portal, you will also be able to view your health information using other applications (apps) compatible with our system.

## 2022-10-03 NOTE — DISCHARGE NOTE PROVIDER - NSDCMRMEDTOKEN_GEN_ALL_CORE_FT
benzonatate 100 mg oral capsule: 1 cap(s) orally every 8 hours, As Needed -Cough - for cough   Eliquis 2.5 mg oral tablet: 1 tab(s) orally 2 times a day   Lipitor 20 mg oral tablet: 1 tab(s) orally once a day  tamsulosin 0.4 mg oral capsule: 1 cap(s) orally once a day

## 2022-10-03 NOTE — DISCHARGE NOTE PROVIDER - HOSPITAL COURSE
This is an 88 yo m with hx of BPH, htn, hld, AAA scheduled for elective repair in 10 days, presenting to the ED with daughter due to fatigue and generalized weakness x1 w, inability to ambulate (at baseline ambulates with walker and completes ADLs w/o assistance He tested COVID +4 days ago. afebrile, O2 sat 97% on RA, however DD 7759 so CTA was done showing segmental and subsegmental L PE possibly chronic, w/o R heart strain.       COVID PNA:  - O2 sats normal on RA  - No role of rem/ dex  - Supportive treatment  - PRN Tessalon Pearle    Acute vs chronic L PE / DVT acute L LE  - full dose Lovenox switch to DOAC on dc, as per vascular will give lower dose Eliquis     AAA:  - S/p abdominal aortic aneurysm with endograft 2014. Post procedure patient had expansion of his sac after initial regression and was treated for type II endoleak with coil embolization. Since that time the sac has increased in size  - Most recent aortic duplex study in 8/23 which shows a greater than 12 cm aortic sac.  - Pt was scheduled for Sx that was cancelled for now  - Discussed with Dr. Hoffman,  - 2D Echo: His EF is preserved on echo which was performed today revealing EF 60 to 65% with grade 1 diastolic dysfunction and moderate aortic insufficiency noted  - Seen by cardio, not cleared by sx now, will need outpt follow up    SOBIA:  - Likely pre renal  - Improved after IVF    Hypernatremia:  - Lasix held, monitor    Impaired ambulation:  - PT : home PT    HTN:  - BP overall stable  - Not on meds       HLD:  - Continue statin      Multi-lobulated liver cyst seen. Consider dedicated abdominal study   to further assess. can do outpt This is an 89 yo m with hx of BPH, htn, hld, AAA scheduled for elective repair in 10 days, presenting to the ED with daughter due to fatigue and generalized weakness x1 w, inability to ambulate (at baseline ambulates with walker and completes ADLs w/o assistance He tested COVID +4 days ago. afebrile, O2 sat 97% on RA, however DD 7759 so CTA was done showing segmental and subsegmental L PE possibly chronic, w/o R heart strain.       COVID PNA:  - O2 sats normal on RA  - No role of rem/ dex  - Supportive treatment  - PRN Tessalon Pearle    Acute vs chronic L PE / DVT acute L LE  - S/P full dose Lovenox, as per his outside vascular Dr. Longoria will dc him on lower dose Eliquis ( usually 10 mg BID X 7 days followed by 5 mg BID), will dc 2.5 mg BID    AAA:  - S/p abdominal aortic aneurysm with endograft 2014. Post procedure patient had expansion of his sac after initial regression and was treated for type II endoleak with coil embolization. Since that time the sac has increased in size  - Most recent aortic duplex study in 8/23 which shows a greater than 12 cm aortic sac.  - Pt was scheduled for Sx that was cancelled for now  - Discussed with Dr. Hoffman,  - 2D Echo: His EF is preserved on echo which was performed today revealing EF 60 to 65% with grade 1 diastolic dysfunction and moderate aortic insufficiency noted  - Seen by cardio, not cleared by sx now, will need outpt follow up    SOBIA:  - Likely pre renal  - Improved after IVF    Hypernatremia:  - Lasix held, monitor    Impaired ambulation:  - PT : home PT    HTN:  - BP overall stable  - Not on meds       HLD:  - Continue statin      Multi-lobulated liver cyst seen. Consider dedicated abdominal study   to further assess. can do outpt    Discussed with his daughter.  Discussed with Dr. Longoria

## 2022-10-03 NOTE — DISCHARGE NOTE PROVIDER - CARE PROVIDER_API CALL
PCP,   Phone: (   )    -  Fax: (   )    -  Follow Up Time:     Joey Suazo)  Medicine  Cardiology  [address] [place] [address]  Phone: [phone]  Fax: [phone]  Follow Up Time:

## 2022-10-05 PROBLEM — I50.22 CHRONIC SYSTOLIC (CONGESTIVE) HEART FAILURE: Chronic | Status: ACTIVE | Noted: 2022-01-01

## 2022-10-05 PROBLEM — I71.4 ABDOMINAL AORTIC ANEURYSM, WITHOUT RUPTURE: Chronic | Status: ACTIVE | Noted: 2022-01-01

## 2022-10-06 NOTE — ED ADULT TRIAGE NOTE - CHIEF COMPLAINT QUOTE
As per daughter pt with fever 102.7 PO and cough. Advil 400mg given at 1730. EMS states on arrival to states pt noted with hypoxia at 86%. Pt Covid positive x Sept 30th

## 2022-10-06 NOTE — ED ADULT NURSE NOTE - NSICDXPASTMEDICALHX_GEN_ALL_CORE_FT
PAST MEDICAL HISTORY:  AAA (abdominal aortic aneurysm)     Acute CHF chf    Chronic systolic congestive heart failure

## 2022-10-06 NOTE — ED PROVIDER NOTE - PHYSICAL EXAMINATION
VITAL SIGNS: I have reviewed nursing notes and confirm.   GEN: Well-developed; well-nourished; ill appearing, fatigued Speaking full sentences. (+) [place]  SKIN: Warm, pink, no rash, no diaphoresis, no cyanosis, well perfused.   HEAD: Normocephalic; atraumatic. No scalp lacerations, no abrasions.  NECK: Supple; non tender.   EYES: Pupils 3mm equal, round, reactive to light and accomodation, conjunctiva and sclera clear. Extra-ocular movements intact bilaterally.  ENT: No nasal discharge; airway clear. Trachea is midline. ORAL: No oropharyngeal exudates or erythema. Normal dentition.  CV: Regular rate and rhythm. S1, S2 normal; no murmurs, gallops, or rubs. No lower extremity pitting edema bilaterally. Capillary refill < 2 seconds throughout. Distal pulses intact 2+ throughout.  RESP: (+) LEFT sided rhonchi, rales. good air entry otherwise. no tachypnea   ABD: Normal bowel sounds, soft, non-distended, non-tender, no rebound, no guarding, no rigidity, no hepatosplenomegaly. No CVA tenderness bilaterally.  MSK: Normal range of motion and movement of all 4 extremities. No joint or muscular pain throughout. No clubbing.   BACK: No thoracolumbar midline or paravertebral tenderness. No step-offs or obvious deformities.  NEURO: Alert & oriented x 3, Grossly unremarkable. Sensory and motor intact throughout. No focal deficits.  Normal speech and coordination.   PSYCH: Cooperative, appropriate.

## 2022-10-06 NOTE — ED ADULT NURSE NOTE - NSIMPLEMENTINTERV_GEN_ALL_ED
Implemented All Fall with Harm Risk Interventions:  [place] to call system. Call bell, personal items and telephone within reach. Instruct patient to call for assistance. Room bathroom lighting operational. Non-slip footwear when patient is off stretcher. Physically safe environment: no spills, clutter or unnecessary equipment. Stretcher in lowest position, wheels locked, appropriate side rails in place. Provide visual cue, wrist band, yellow gown, etc. Monitor gait and stability. Monitor for mental status changes and reorient to person, place, and time. Review medications for side effects contributing to fall risk. Reinforce activity limits and safety measures with patient and family. Provide visual clues: red socks.

## 2022-10-06 NOTE — ED PROVIDER NOTE - OBJECTIVE STATEMENT
89M PMHx BPH, htn, hld, AAA s/p endograft repair (2014), diastolic HF, pulmonary embolism/DVT on eliquis, prior COVID admission s/p plaxvoid, presenting with fever today. 89M PMHx BPH, htn, hld, AAA s/p endograft repair (2014), diastolic HF, pulmonary embolism/DVT on eliquis, prior COVID admission s/p plaxvoid, presenting with fever today. Daughter reporting patient was found fatigued and weak at home. Was sleeping all day. Otherwise, mild nonproductive cough since being admitted and d/c'd covid 4 days ago. (+) fevers at home. otherwise at baseline mental status. Denies any chest pain, abdominal pain, shortness of breath, nausea/vomiting, headaches,   diarrhea ,constipation, weakness, syncope, hematuria, dysuria, urinary symptoms, subjective neurological deficits.

## 2022-10-06 NOTE — ED ADULT NURSE NOTE - OBJECTIVE STATEMENT
Received pt in the ED, AOx3, from the triage. C/o fever, +COVID. Pt's daughter endorsed fever today, was given advil 400 mg given at home. Pt is noted febrile in ED, hypotensive ,placed on continues cardiac monitor. Pt noted lethargic , responsive to painful and verbal stimuli. Placed on levo drip. On 6L nc. PMHx BPH, htn, hld, AAA s/p endograft repair (2014), diastolic HF, pulmonary embolism/DVT on eliquis,

## 2022-10-06 NOTE — ED PROVIDER NOTE - CRITICAL CARE ATTENDING CONTRIBUTION TO CARE
Upon my evaluation, this patient had a high probability of imminent or lifethreatening deterioration due to septic shock secondary to pna, which required my direct attention, intervention, and personal management.     I have personally provided 35 minutes of critical care time exclusive of time spent on separately billable procedures. Time includes review of laboratory data, radiology results, discussion with consultants, and monitoring for potential decompensation. Interventions were performed as documented above.    - Samuel Lees MD, Emergency Medicine and Medical Toxicology Attending.

## 2022-10-06 NOTE — ED PROVIDER NOTE - CARE PLAN
Principal Discharge DX:	Septic shock  Secondary Diagnosis:	PNA (pneumonia)  Secondary Diagnosis:	COVID-19  Secondary Diagnosis:	History of abdominal aortic aneurysm (AAA)   1

## 2022-10-06 NOTE — ED PROVIDER NOTE - CLINICAL SUMMARY MEDICAL DECISION MAKING FREE TEXT BOX
The patient meets SIRS criteria with (+)  Temp > 100.4F or < 96.8F,  WBC >12k or <4k, or >10% bands. Suspected source of infection is: UTI Vital Signs PNA; Patient is currently hemodynamically stable on levophed, normotensive, mentating well.   - Two large-bore IV's, 30ml/kg lactated ringers fluid resuscitation,   - CBC, CMP, PT/PTT/INR, Troponin, BNP, UA/UCx, 2 x BCx, ABG with lactate + COVID-19 rule out.   - CXR  - Unknown Source: Vancomycin 1g IVPB + zosyn  - Re-assessment for fluid responsiveness then admit

## 2022-10-06 NOTE — ED PROVIDER NOTE - PROGRESS NOTE DETAILS
consulted ICU for sepsis secondary to pna on pressors. levophed. otherwise improved mentation. hemodynamically stable. pending admit

## 2022-10-06 NOTE — ED ADULT NURSE NOTE - CAS EDN DISCHARGE INTERVENTIONS
IV intact/Indwelling catheter secured and draining Arm band on/IV intact/Indwelling catheter secured and draining

## 2022-10-07 NOTE — H&P ADULT - ASSESSMENT
Admitted to the ICU for management of severe sepsis with shock secondary to PNA, hypoxemia, covid infection, PE /DVT and dehydration.     Plan:     Neuro: No issues  No pain.   Monitor temp. Initially 101.1 and now 97.8  Tylenol as needed for fever.     CV: Septic Shock, hypovolemia  Continue hemodynamic monitoring   Bedside POCUS done: predominantly A lines, some b lines on the left. IVC variable. LV seemed normal with adequate contractility. No pericardial effusion seen.    Pt got 1.5 L of IV fluids in the ED.   One L bolus ordered.   Started on midodrine 10 mg q 8 hrs.   On Levophed drip to target MAP 65-70.   Lactate normal   Continue Lipitor home medication.     Pulmonary: Pneumonia. Hypoxemia. Covid infection. PE/DVT  On 6 L nasal cannula.   ABG done.   CXR with left sided opacity seen. Report pending.   Still covid positive.    Wean down FiO2 as tolerated   Incentive spirometer  Chest PT q 6 hrs  RVP sent    GI: No issues.   NPO except meds    Endo: No issues.   Monitor fingersticks q 6 hrs     Renal: BPH  Mccormack in place in a critical ill pt for accurate urinary monitoring   I and O hourly   Monitor and correct electrolytes  Resume flomax home medication    ID: Pneumonia, Covid infection   Blood and urine cultures sent.  Lactate level normal range.   Started on zosyn and vanco for now  Follow up Vanco level   RVP and MRSA swab ordered  Sputum culture if able   procalcitonin level ordered  Isolation for covid infection.     Heme: PE/DVT (diagnosed last admission)  AM labs ordered  Was on Eliquis at home. Will start lovenox full AC dose for now.  DVT prophylaxis - already of full dose on lovenox     Other/Disposition:  Patient will be admitted to the ICU.   Patient informed of plan.   Code Status: Full code    Case discussed with ICU Attending.        Admitted to the ICU for management of severe sepsis with shock secondary to PNA, hypoxemia, covid infection, PE /DVT and dehydration.     Plan:     Neuro: No issues  No pain.   Monitor temp. Initially 101.1 and now 97.8  Tylenol as needed for fever.     CV: Septic Shock, hypovolemia  Continue hemodynamic monitoring   Bedside POCUS done: predominantly A lines, some b lines on the left. IVC variable. LV seemed normal with adequate contractility. No pericardial effusion seen.    Recent Echo with EF 60 to 65% with grade 1 diastolic dysfunction and moderate aortic insufficiency.   Pt got 1.5 L of IV fluids in the ED.   One L bolus ordered.   Started on midodrine 10 mg q 8 hrs.   On Levophed drip to target MAP 65-70.   Lactate normal   Continue Lipitor home medication.     Pulmonary: Pneumonia. Hypoxemia. Covid infection. PE/DVT  On 6 L nasal cannula.   ABG done.   CXR with left sided opacity seen. Report pending.   Still covid positive.    Wean down FiO2 as tolerated   Incentive spirometer  Chest PT q 6 hrs  RVP sent    GI: no issues  NPO except meds for now,     Endo: No issues.   Monitor fingersticks q 6 hrs     Renal: BPH  Mccormack in place in a critical ill pt for accurate urinary monitoring   I and O hourly   Monitor and correct electrolytes  Resume flomax home medication    ID: Pneumonia, Covid infection   Blood and urine cultures sent.  Lactate level normal range.   Started on zosyn and vanco for now  Follow up Vanco level   RVP and MRSA swab ordered  Sputum culture if able   procalcitonin level ordered  Isolation for covid infection.     Heme: PE/DVT (diagnosed last admission)  AM labs ordered  Was on Eliquis at home. Will start lovenox full AC dose for now.  DVT prophylaxis - already of full dose on lovenox     Vascular: s/p abdominal aortic aneurysm with endograft 2014. Post procedure patient had expansion of his sac after initial regression and was treated for type II endoleak with coil embolization. Since that time the sac has increased in size. Most recent aortic duplex study in 8/23 which shows a greater than 12 cm aortic sac. Not cleared by cardio for elective repair.   -Vascular follow up.     Other/Disposition:  Patient will be admitted to the ICU.   Patient informed of plan.   Code Status: Full code    Case discussed with ICU Attending.      89 year old man with a PMHx of dyslipidemia, hypertension, BPH and history of a AAA repair with a stent graft in [place]. Patient was supposed to have elective repair of his AAA but deemed not fit to proceed due to recent illness. Of Note, patient was recently hospitalized at [facility] from 9/29-10/3 for covid PNA, segmental and subsegmental emboli involving the left lower lobe and acute nonocclusive thrombus in the left mid femoral vein.     Now admitted to the ICU for management of severe sepsis with shock secondary to PNA, hypoxemia, covid infection, PE /DVT and dehydration.     Plan:     Neuro: No issues  No pain.   Monitor temp. Initially 101.1 and now 97.8  Tylenol as needed for fever.     CV: Septic Shock, hypovolemia  Continue hemodynamic monitoring   Bedside POCUS done: predominantly A lines, some b lines on the left. IVC variable. LV seemed normal with adequate contractility. No pericardial effusion seen.    Recent Echo in chart with EF 60 to 65% with grade 1 diastolic dysfunction and moderate aortic insufficiency.   Pt got 1.5 L of IV fluids in the ED.   One L bolus ordered.   Started on midodrine 10 mg q 8 hrs.   On Levophed drip to target MAP 65-70.   Lactate normal   Continue Lipitor home medication.     Pulmonary: Pneumonia. Hypoxemia. Covid infection. PE/DVT  On 6 L nasal cannula.   ABG done.   CXR with left sided opacity seen. Report pending.   Still covid positive.    Wean down FiO2 as tolerated   Incentive spirometer  Chest PT q 6 hrs  RVP sent    GI: hypoalbuminemia  NPO for now.   Nutrition consult.      Endo: No issues.   Monitor fingersticks q 6 hrs     Renal: BPH  Mccormack in place in a critical ill pt for accurate urinary monitoring   I and O hourly   Monitor and correct electrolytes  Resume flomax home medication    ID: Pneumonia, Covid infection   Blood and urine cultures sent.  Lactate level normal range.   Started on zosyn and vanco for now  Follow up Vanco level   RVP and MRSA swab ordered  Sputum culture if able   procalcitonin level ordered  Isolation for covid infection.     Heme: PE/DVT (diagnosed last admission)  AM labs ordered  Was on Eliquis at home. Will start lovenox full AC dose for now.  DVT prophylaxis - already of full dose on lovenox     Vascular: s/p abdominal aortic aneurysm with endograft 2014. Post procedure patient had expansion of his sac after initial regression and was treated for type II endoleak with coil embolization. Since that time the sac has increased in size. Most recent aortic duplex study in 8/23 which shows a greater than 12 cm aortic sac. Not cleared by cardio for elective repair.   -Vascular follow up as needed.     Other/Disposition:  Patient will be admitted to the ICU.   Patient informed of plan.   Code Status: Full code    Case discussed with ICU Attending.

## 2022-10-07 NOTE — H&P ADULT - NSHPLABSRESULTS_GEN_ALL_CORE
Lab Results:    CBC Full  -  ( 06 Oct 2022 20:42 )  WBC Count : 13.88 K/uL  RBC Count : 3.38 M/uL  Hemoglobin : 9.6 g/dL  Hematocrit : 30.4 %  Platelet Count - Automated : 224 K/uL  Mean Cell Volume : 89.9 fl  Mean Cell Hemoglobin : 28.4 pg  Mean Cell Hemoglobin Concentration : 31.6 g/dL  Auto Neutrophil # : 12.64 K/uL  Auto Lymphocyte # : 0.53 K/uL  Auto Monocyte # : 0.57 K/uL  Auto Eosinophil # : 0.01 K/uL  Auto Basophil # : 0.02 K/uL  Auto Neutrophil % : 91.1 %  Auto Lymphocyte % : 3.8 %  Auto Monocyte % : 4.1 %  Auto Eosinophil % : 0.1 %  Auto Basophil % : 0.1 %      Chemistry                        9.6    13.88 )-----------( 224      ( 06 Oct 2022 20:42 )             30.4     10-    142  |  110<H>  |  29<H>  ----------------------------<  93  3.9   |  25  |  1.13    Ca    8.4<L>      06 Oct 2022 20:42  Phos  2.8     10-06  Mg     2.3     10-    TPro  5.8<L>  /  Alb  2.4<L>  /  TBili  0.5  /  DBili  x   /  AST  34  /  ALT  49  /  AlkPhos  84  10-06    LIVER FUNCTIONS - ( 06 Oct 2022 20:42 )  Alb: 2.4 g/dL / Pro: 5.8 gm/dL / ALK PHOS: 84 U/L / ALT: 49 U/L / AST: 34 U/L / GGT: x           PT/INR - ( 06 Oct 2022 20:42 )   PT: 12.5 sec;   INR: 1.05 ratio    PTT - ( 06 Oct 2022 20:42 )  PTT:32.4 sec  Urinalysis Basic - ( 07 Oct 2022 00:00 )    Color: Yellow / Appearance: Clear / S.000 / pH: x  Gluc: x / Ketone: Negative  / Bili: Negative / Urobili: Negative mg/dL   Blood: x / Protein: Negative mg/dL / Nitrite: Negative   Leuk Esterase: Negative / RBC: x / WBC x   Sq Epi: x / Non Sq Epi: x / Bacteria: x      ABG - ( 06 Oct 2022 20:55 )  pH, Arterial: 7.42  pH, Blood: x     /  pCO2: 36    /  pO2: 68    / HCO3: 23    / Base Excess: -0.8  /  SaO2: 97.6        Lactate, Blood: 0.9 mmol/L (10-06-22 @ 20:42)      RADIOLOGY RESULTS:

## 2022-10-07 NOTE — CHART NOTE - NSCHARTNOTEFT_GEN_A_CORE
Patient initially had a coude catheter placed in the ED for urinary monitoring. Patient was admitted to the ICU and noted in the am that the dia catheter was out. Dia was left out for trial of void. Patient was making minimal urine output during the day and it was bloody. Bladder scan done with about 400 ml seen. Patient complaining of the urge to urinate but can't.  An 18 F coude catheter was placed without any issues. Gross hematuria noted. Urine output was about 600 ml.  Patient was already downgraded to medicine. Called Medicine DANAY Benitez and informed him of the above.

## 2022-10-07 NOTE — CHART NOTE - NSCHARTNOTEFT_GEN_A_CORE
89 yo WM with h/o AAA repair with a stent graft in [place], HTN, HLD and BPH. Pt was supposed to have elective repair of his AAA but deemed not fit to proceed due to recent illness. Of Note, pt was recently hospitalized at [facility] from 9/29-10/3 for Covid PNA, segmental and subsegmental emboli involving the left lower lobe and acute nonocclusive thrombus in the left mid femoral vein. Pt presented to the ER with daughter 2 to fever 102.7( at home), non productive cough and weakness. In the ER, Vitals significant for temp 101.1, BP: 91/54, HR: 98. O2 Sat: 86 %. Pt was placed on 6 L NC. On work up, WBC 13, neutrophil 91 %, ABG: PaO2 68. CXR with opacity on the left. Pt got 1.5 L of IV fluids in the ED and was started on norepinephrine drip. ICU admitting dx: Septic shock 2 to PNA. Pt weaned off vasopressors since early this morning; remains hemodynamically stable. Titrated down from 6L NC O2 to 3L. Pt is breathing comfortably; tolerating PO diet. Pt is medically stable for downgrade.     Pt seen and case discussed with ICU attending Dr. Butcher.   Verbal handoff given to hospitalist Dr. Adkins. 89 yo WM with h/o AAA repair with a stent graft in [place], HTN, HLD and BPH. Pt was supposed to have elective repair of his AAA but deemed not fit to proceed due to recent illness. Of Note, pt was recently hospitalized at [facility] from 9/29-10/3 for Covid PNA, segmental and subsegmental emboli involving the left lower lobe and acute nonocclusive thrombus in the left mid femoral vein. Pt presented to the ER with daughter 2 to fever 102.7( at home), non productive cough and weakness. In the ER, Vitals significant for temp 101.1, BP: 91/54, HR: 98. O2 Sat: 86 %. Pt was placed on 6 L NC. On work up, WBC 13, neutrophil 91 %, ABG: PaO2 68. CXR with opacity on the left. Pt got 1.5 L of IV fluids in the ED and was started on norepinephrine drip. ICU admitting dx: Septic shock 2 to PNA. Pt weaned off vasopressors since early this morning; remains hemodynamically stable. Titrated down from 6L NC O2 to 3L. Pt is breathing comfortably; tolerating PO diet. Pt is medically stable for downgrade.     Pt seen and case discussed with ICU attending Dr. Butcher.   Verbal handoff given to hospitalist Dr. Adkins.      Case d/w ICU  patient s/e at bedside.  wife at bedside  weak, alert, bp with map > 85  on 3 liters  noted urine retention > 300. ICU to place caude. (patient started on flomax in ICU)    plan:  c/w oral meds  PT  may need TOV in house

## 2022-10-07 NOTE — PHARMACOTHERAPY INTERVENTION NOTE - COMMENTS
As per policy, ordered an MRSA nasal PCR in the setting of vancomycin utilized empirically for pneumonia.

## 2022-10-07 NOTE — PATIENT PROFILE ADULT - FALL HARM RISK - HARM RISK INTERVENTIONS

## 2022-10-07 NOTE — H&P ADULT - CRITICAL CARE ATTENDING COMMENT
Pt is an 89 yo WM with h/o AAA repair with a stent graft in [place], HTN, HLD and BPH. Pt was supposed to have elective repair of his AAA but deemed not fit to proceed due to recent illness. Of Note, pt was recently hospitalized at [facility] from 9/29-10/3 for Covid PNA, segmental and subsegmental emboli involving the left lower lobe and acute nonocclusive thrombus in the left mid femoral vein. Pt presented to the ER with daughter 2 to fever 102.7( at home), non productive cough and weakness. In the ER, Vitals significant for temp 101.1, BP: 91/54, HR: 98. O2 Sat: 86 %. Pt was placed on 6 L NC. On work up, WBC 13, neutrophil 91 %, ABG: PaO2 68. CXR with opacity on the left. Pt got 1.5 L of IV fluids in the ED and was started on norepinephrine drip. ICU admitting dx: Septic shock 2 to PNA    Resp: Supplemental O2 to maintain O2 sat > 92%/ Repeat CT chest noted: multifocal PNA  ID: F/u Cx/ Cont Vanco + Zosyn/ Check with infection control if pt still needs isolation for Covid-19  CVS: Currently off Levophed and cont Midodrine  Heme: Cont full AC with Lovenox  FEN: Po diet  Endo: Follow Glu  Social: If BP remains stable may transfer to [facility]    CCT: 40 min not in conjunction with the PA

## 2022-10-07 NOTE — H&P ADULT - HISTORY OF PRESENT ILLNESS
89 year old man with a PMHx of dyslipidemia, hypertension, BPH and history of a AAA repair with a stent graft in [place]. Patient was supposed to have elective repair of his AAA but deemed high risk due to recent illness. Of Note, patient was recently hospitalized at [facility] from 9/29-10/3 for covid PNA, segmental and subsegmental emboli involving the left lower lobe, acute nonocclusive thrombus in the left mid femoral vein.       Patient presented with fever 101.1.   Denies chest pain, palpitations, abdominal pain, headache, dizziness, shortness of breath, syncope or worsening edema.  89 year old man with a PMHx of dyslipidemia, hypertension, BPH and history of a AAA repair with a stent graft in [place]. Patient was supposed to have elective repair of his AAA but deemed not fit to proceed due to recent illness. Of Note, patient was recently hospitalized at [facility] from 9/29-10/3 for covid PNA, segmental and subsegmental emboli involving the left lower lobe and acute nonocclusive thrombus in the left mid femoral vein.     Patient presented to the ED with daughter with complaints of with fever 102.7( at home), non productive cough and weakness. In the ED, Vitals significant for temp 101.1, BP: 91/54, HR: 98. O2 Sat: 86 %. Patient was placed on 6 L NC. On work up, WBC 13, neutrophil 91 %, albumin 2.4, lactate 0.9. ABG: PaO2 68. CXR with opacity on the left. Patient denies chest pain, palpitations, abdominal pain, headache, dizziness, shortness of breath, syncope or worsening edema. Patient got 1.5 L of IV fluids in the ED and was started on norepinephrine drip for BP support. Patient admitted to the ICU for further management.

## 2022-10-07 NOTE — H&P ADULT - NSHPPHYSICALEXAM_GEN_ALL_CORE
CONSTITUTIONAL: laying in bed, looks weak.   EYES: PERRLA and symmetric, No conjunctival or scleral injection, non-icteric  ENMT: Oral mucosa with dry membranes.   NECK: Supple, symmetric and without tracheal deviation   RESP: Clear to ascultation, bilateral air entry. no wheezing or rhonchi   CV: RRR, S1, S2, no murmur heard  GI: Soft, NT, ND, no rebound, no guarding. Bowel sounds present   : dia in place.   LYMPH: No cervical LAD   SKIN: No rashes or ulcers noted. Skin turgor.   NEURO: awake and alert

## 2022-10-07 NOTE — H&P ADULT - NSICDXPASTMEDICALHX_GEN_ALL_CORE_FT
PAST MEDICAL HISTORY:  AAA (abdominal aortic aneurysm)     Acute CHF     Chronic systolic congestive heart failure

## 2022-10-08 NOTE — PHARMACOTHERAPY INTERVENTION NOTE - COMMENTS
Recommended to discontinue vancomycin since the MRSA PCR is negative and sputum culture is growing E. coli. Recommended to discontinue vancomycin since the MRSA PCR is negative and sputum culture is growing E. coli.    Tree Kaplan, PharmD, BCIDP  Clinical Pharmacy Specialist, Infectious Diseases  Tele-Antimicrobial Stewardship Program (Tele-ASP)  Tele-ASP Phone: [phone]

## 2022-10-08 NOTE — PROGRESS NOTE ADULT - SUBJECTIVE AND OBJECTIVE BOX
Patient is a 89y old  Male who presents with a chief complaint of Septic Shock, PNA (07 Oct 2022 00:10)      INTERVAL HPI/OVERNIGHT EVENTS: improved breathing     MEDICATIONS  (STANDING):  atorvastatin 20 milliGRAM(s) Oral at bedtime  enoxaparin Injectable 60 milliGRAM(s) SubCutaneous every 12 hours  lactated ringers. 1000 milliLiter(s) (75 mL/Hr) IV Continuous <Continuous>  midodrine 10 milliGRAM(s) Oral every 8 hours  piperacillin/tazobactam IVPB.. 3.375 Gram(s) IV Intermittent every 8 hours  sodium chloride 3%  Inhalation 4 milliLiter(s) Inhalation every 12 hours  tamsulosin 0.4 milliGRAM(s) Oral at bedtime  vancomycin  IVPB 1250 milliGRAM(s) IV Intermittent <User Schedule>    MEDICATIONS  (PRN):  guaiFENesin Oral Liquid (Sugar-Free) 200 milliGRAM(s) Oral every 6 hours PRN Cough      Allergies    No Known Allergies    Intolerances        REVIEW OF SYSTEMS:  CONSTITUTIONAL: No fever, weight loss  EYES: No eye pain, visual disturbances, or discharge  ENMT:  No difficulty hearing, tinnitus, vertigo; No sinus or throat pain  RESPIRATORY: ++ cough,  CARDIOVASCULAR: No chest pain, palpitations, dizziness, or leg swelling  GASTROINTESTINAL: No abdominal or epigastric pain. No nausea, vomiting, or hematemesis; No diarrhea or constipation. No melena or hematochezia.  GENITOURINARY: No dysuria, frequency, hematuria, or incontinence  NEUROLOGICAL: No headaches, memory loss, loss of strength, numbness, or tremors  SKIN: No itching, burning, rashes, or lesions   MUSCULOSKELETAL: No joint pain or swelling; No muscle, back, or extremity pain  PSYCHIATRIC: No depression, anxiety, mood swings, or difficulty sleeping  HEME/LYMPH: No easy bruising, or bleeding gums      Vital Signs Last 24 Hrs  T(C): 36.8 (08 Oct 2022 10:36), Max: 38.1 (07 Oct 2022 20:21)  T(F): 98.3 (08 Oct 2022 10:36), Max: 100.5 (07 Oct 2022 20:21)  HR: 90 (08 Oct 2022 10:36) (70 - 117)  BP: 122/75 (08 Oct 2022 10:36) (95/81 - 143/82)  BP(mean): 100 (07 Oct 2022 21:14) (78 - 101)  RR: 18 (08 Oct 2022 10:36) (15 - 27)  SpO2: 97% (08 Oct 2022 10:36) (91% - 97%)    Parameters below as of 08 Oct 2022 10:36  Patient On (Oxygen Delivery Method): nasal cannula        PHYSICAL EXAM:  GENERAL: NAD  HEAD:  Atraumatic, Normocephalic  EYES: EOMI, PERRLA, conjunctiva and sclera clear  ENMT: No tonsillar erythema, exudates, or enlargement;   NECK: Supple, Normal thyroid  NERVOUS SYSTEM:  Alert & Oriented X3, Good concentration; Motor Strength 5/5 B/L upper and lower extremities; DTRs 2+ intact and symmetric  CHEST/LUNG: ++ rhonchi, no wheezing, or rubs, no distress   HEART: Regular rate and rhythm; No murmurs, rubs, or gallops  ABDOMEN: Soft, Nontender, Nondistended; Bowel sounds present  EXTREMITIES:  2+ Peripheral Pulses, No clubbing, cyanosis, or edema  LYMPH: No lymphadenopathy noted  SKIN: No rashes or lesions    LABS:                        7.9    7.64  )-----------( 181      ( 08 Oct 2022 06:40 )             25.8     10-07    143  |  112<H>  |  27<H>  ----------------------------<  132<H>  4.0   |  23  |  1.12    Ca    8.2<L>      07 Oct 2022 02:30  Phos  3.1     10-  Mg     2.0     10-    TPro  5.8<L>  /  Alb  2.4<L>  /  TBili  0.5  /  DBili  x   /  AST  34  /  ALT  49  /  AlkPhos  84  10-06    PT/INR - ( 06 Oct 2022 20:42 )   PT: 12.5 sec;   INR: 1.05 ratio         PTT - ( 06 Oct 2022 20:42 )  PTT:32.4 sec  Urinalysis Basic - ( 07 Oct 2022 00:00 )    Color: Yellow / Appearance: Clear / S.000 / pH: x  Gluc: x / Ketone: Negative  / Bili: Negative / Urobili: Negative mg/dL   Blood: x / Protein: Negative mg/dL / Nitrite: Negative   Leuk Esterase: Negative / RBC: x / WBC x   Sq Epi: x / Non Sq Epi: x / Bacteria: x      CAPILLARY BLOOD GLUCOSE      POCT Blood Glucose.: 116 mg/dL (07 Oct 2022 16:38)      RADIOLOGY & ADDITIONAL TESTS:    Imaging Personally Reviewed:  [ ] YES  [ ] NO    Consultant(s) Notes Reviewed:  [ ] YES  [ ] NO    Care Discussed with Consultants/Other Providers [ ] YES  [ ] NO

## 2022-10-08 NOTE — PROGRESS NOTE ADULT - ASSESSMENT
88 yo WM with h/o AAA repair with a stent graft in [place], HTN, HLD and BPH. Pt was supposed to have elective repair of his AAA but deemed not fit to proceed due to recent illness. Of Note, pt was recently hospitalized at [facility] from 9/29-10/3 for Covid PNA, segmental and subsegmental emboli involving the left lower lobe and acute nonocclusive thrombus in the left mid femoral vein. Pt presented to the ER with daughter 2 to fever 102.7( at home), non productive cough and weakness. In the ER, Vitals significant for temp 101.1, BP: 91/54, HR: 98. O2 Sat: 86 %. Pt was placed on 6 L NC. On work up, WBC 13, neutrophil 91 %, ABG: PaO2 68. CXR with opacity on the left. Pt got 1.5 L of IV fluids in the ED and was started on norepinephrine drip. ICU admitting dx: Septic shock 2 to PNA. Pt weaned off vasopressors since early this morning; remains hemodynamically stable. Titrated down from 6L NC O2 to 3L. Pt is breathing comfortably; tolerating PO diet. Pt is medically stable for downgrade.     septic shock poa 2/2 gram neg pna   - c.w vanc/zosyn   - trial of hypertonic saline with chest pt for rhonchi   - HDS   - midodrine         acute hypoxic resp failure 2/2 PE and PNA     PE  -  lovenox     acute urinary retention with hematuria   - likely traumatic insertion with bph on lovenox    - monitor cbc and urine output   - flomax     acute blood loss anemia on AOCD

## 2022-10-08 NOTE — PHARMACOTHERAPY INTERVENTION NOTE - COMMENTS
Recommended to discontinue vancomycin level since vancomycin medication order has been discontinued.    Tree Kaplan, PharmD, [facility]  Clinical Pharmacy Specialist, Infectious Diseases  Tele-Antimicrobial Stewardship Program (Tele-ASP)  Tele-ASP Phone: [phone]

## 2022-10-09 NOTE — PROGRESS NOTE ADULT - SUBJECTIVE AND OBJECTIVE BOX
Patient is a 89y old  Male who presents with a chief complaint of Septic Shock, PNA (07 Oct 2022 00:10)      INTERVAL HPI/OVERNIGHT EVENTS: improved breathing     MEDICATIONS  (STANDING):  atorvastatin 20 milliGRAM(s) Oral at bedtime  enoxaparin Injectable 60 milliGRAM(s) SubCutaneous every 12 hours  lactated ringers. 1000 milliLiter(s) (75 mL/Hr) IV Continuous <Continuous>  midodrine 10 milliGRAM(s) Oral every 8 hours  piperacillin/tazobactam IVPB.. 3.375 Gram(s) IV Intermittent every 8 hours  sodium chloride 3%  Inhalation 4 milliLiter(s) Inhalation every 12 hours  tamsulosin 0.4 milliGRAM(s) Oral at bedtime  vancomycin  IVPB 1250 milliGRAM(s) IV Intermittent <User Schedule>    MEDICATIONS  (PRN):  guaiFENesin Oral Liquid (Sugar-Free) 200 milliGRAM(s) Oral every 6 hours PRN Cough      Allergies    No Known Allergies    Intolerances        REVIEW OF SYSTEMS:  CONSTITUTIONAL: No fever, weight loss  EYES: No eye pain, visual disturbances, or discharge  ENMT:  No difficulty hearing, tinnitus, vertigo; No sinus or throat pain  RESPIRATORY: ++ cough,  CARDIOVASCULAR: No chest pain, palpitations, dizziness, or leg swelling  GASTROINTESTINAL: No abdominal or epigastric pain. No nausea, vomiting, or hematemesis; No diarrhea or constipation. No melena or hematochezia.  GENITOURINARY: No dysuria, frequency, hematuria, or incontinence  NEUROLOGICAL: No headaches, memory loss, loss of strength, numbness, or tremors  SKIN: No itching, burning, rashes, or lesions   MUSCULOSKELETAL: No joint pain or swelling; No muscle, back, or extremity pain  PSYCHIATRIC: No depression, anxiety, mood swings, or difficulty sleeping  HEME/LYMPH: No easy bruising, or bleeding gums      Vital Signs Last 24 Hrs  T(C): 36.8 (09 Oct 2022 11:58), Max: 36.9 (09 Oct 2022 05:30)  T(F): 98.3 (09 Oct 2022 11:58), Max: 98.5 (09 Oct 2022 05:30)  HR: 91 (09 Oct 2022 11:58) (74 - 91)  BP: 102/62 (09 Oct 2022 11:58) (100/67 - 106/66)  BP(mean): --  RR: 20 (09 Oct 2022 11:58) (17 - 20)  SpO2: 98% (09 Oct 2022 11:58) (93% - 98%)    Parameters below as of 09 Oct 2022 06:00  Patient On (Oxygen Delivery Method): nasal cannula  O2 Flow (L/min): 4        PHYSICAL EXAM:  GENERAL: NAD  HEAD:  Atraumatic, Normocephalic  EYES: EOMI, PERRLA, conjunctiva and sclera clear  ENMT: No tonsillar erythema, exudates, or enlargement;   NECK: Supple, Normal thyroid  NERVOUS SYSTEM:  Alert & Oriented X3, Good concentration; Motor Strength 5/5 B/L upper and lower extremities; DTRs 2+ intact and symmetric  CHEST/LUNG: ++ rhonchi, no wheezing, or rubs, no distress   HEART: Regular rate and rhythm; No murmurs, rubs, or gallops  ABDOMEN: Soft, Nontender, Nondistended; Bowel sounds present  EXTREMITIES:  2+ Peripheral Pulses, No clubbing, cyanosis, or edema  LYMPH: No lymphadenopathy noted  SKIN: No rashes or lesions    LABS:                          7.5    4.75  )-----------( 192      ( 09 Oct 2022 07:30 )             23.7     10-09    143  |  111<H>  |  22  ----------------------------<  112<H>  3.7   |  25  |  1.07    Ca    8.4<L>      09 Oct 2022 07:30    TPro  5.1<L>  /  Alb  2.0<L>  /  TBili  0.5  /  DBili  x   /  AST  22  /  ALT  25  /  AlkPhos  68  10-09          CAPILLARY BLOOD GLUCOSE      POCT Blood Glucose.: 116 mg/dL (07 Oct 2022 16:38)      RADIOLOGY & ADDITIONAL TESTS:    Imaging Personally Reviewed:  [ ] YES  [ ] NO    Consultant(s) Notes Reviewed:  [ ] YES  [ ] NO    Care Discussed with Consultants/Other Providers [ ] YES  [ ] NO

## 2022-10-09 NOTE — PROGRESS NOTE ADULT - ASSESSMENT
89 yo WM with h/o AAA repair with a stent graft in [place], HTN, HLD and BPH. Pt was supposed to have elective repair of his AAA but deemed not fit to proceed due to recent illness. Of Note, pt was recently hospitalized at [facility] from 9/29-10/3 for Covid PNA, segmental and subsegmental emboli involving the left lower lobe and acute nonocclusive thrombus in the left mid femoral vein. Pt presented to the ER with daughter 2 to fever 102.7( at home), non productive cough and weakness. In the ER, Vitals significant for temp 101.1, BP: 91/54, HR: 98. O2 Sat: 86 %. Pt was placed on 6 L NC. On work up, WBC 13, neutrophil 91 %, ABG: PaO2 68. CXR with opacity on the left. Pt got 1.5 L of IV fluids in the ED and was started on norepinephrine drip. ICU admitting dx: Septic shock 2 to PNA. Pt weaned off vasopressors since early this morning; remains hemodynamically stable. Titrated down from 6L NC O2 to 3L. Pt is breathing comfortably; tolerating PO diet. Pt is medically stable for downgrade.     septic shock poa 2/2 gram neg pna   - c.w zosyn and dc vanco. sputum cx growing ecoli and and MRSA nare neg   - trial of hypertonic saline with chest pt for rhonchi   - HDS   - midodrine         acute hypoxic resp failure 2/2 PE and PNA     PE  -  lovenox     acute urinary retention with hematuria   - likely traumatic insertion with bph on lovenox    - h/h slowly trending down and still with hematuria   - type and screen in the am   - monitor cbc and urine output   - flomax     acute blood loss anemia on AOCD

## 2022-10-10 NOTE — PROGRESS NOTE ADULT - ATTENDING COMMENTS
acute hypoxic reps failure with multifocal PNA   - sputum growing esbl ecoli pna and will adjust to merem   - breathing improving. c.w aerobika     hematuria   - urology consulted and started CBI   - f/u renal sono     acute blood loss anemia   - transfuse     PE   - c/w ac for now

## 2022-10-10 NOTE — PROVIDER CONTACT NOTE (CRITICAL VALUE NOTIFICATION) - RECOMMENDATIONS
Per PA, pt is to be transfused PRBC and FC to be irrigated per orders. Urology consult will be ordered.

## 2022-10-10 NOTE — CONSULT NOTE ADULT - SUBJECTIVE AND OBJECTIVE BOX
Patient is a 89y old  Male who presents with a chief complaint of Septic Shock, PNA course complicated with covid and PE. Patient started on lovenox therapeutically for PE and patient developed hematuria.       HPI: 89 year old man with a PMHx of dyslipidemia, hypertension, BPH and history of a AAA repair with a stent graft in [place]. Patient was supposed to have elective repair of his AAA but deemed not fit to proceed due to recent illness. Of Note, patient was recently hospitalized at [facility] from 9/29-10/3 for covid PNA, segmental and subsegmental emboli involving the left lower lobe and acute nonocclusive thrombus in the left mid femoral vein.     Patient presented to the ED with daughter with complaints of with fever 102.7( at home), non productive cough and weakness. In the ED, Vitals significant for temp 101.1, BP: 91/54, HR: 98. O2 Sat: 86 %. Patient was placed on 6 L NC. On work up, WBC 13, neutrophil 91 %, albumin 2.4, lactate 0.9. ABG: PaO2 68. CXR with opacity on the left. Patient denies chest pain, palpitations, abdominal pain, headache, dizziness, shortness of breath, syncope or worsening edema. Patient got 1.5 L of IV fluids in the ED and was started on norepinephrine drip for BP support. Patient admitted to the ICU for further management.  (07 Oct 2022 00:10)      PAST MEDICAL & SURGICAL HISTORY:   abdominal hernia     AAA (abdominal aortic aneurysm)    Acute CHF    Chronic systolic congestive heart failure        MEDICATIONS  (STANDING):  atorvastatin 20 milliGRAM(s) Oral at bedtime  enoxaparin Injectable 60 milliGRAM(s) SubCutaneous every 12 hours  meropenem  IVPB 1000 milliGRAM(s) IV Intermittent <User Schedule>  midodrine 10 milliGRAM(s) Oral every 8 hours  sodium chloride 3%  Inhalation 4 milliLiter(s) Inhalation every 12 hours  tamsulosin 0.4 milliGRAM(s) Oral at bedtime    MEDICATIONS  (PRN):  guaiFENesin Oral Liquid (Sugar-Free) 200 milliGRAM(s) Oral every 6 hours PRN Cough      Allergies    No Known Allergies      SOCIAL HISTORY: No illicit drug use or smoking     FAMILY HISTORY:  No pertinent family history in first degree relatives        Vital Signs Last 24 Hrs  T(C): 36.6 (10 Oct 2022 05:21), Max: 36.6 (10 Oct 2022 05:21)  T(F): 97.9 (10 Oct 2022 05:21), Max: 97.9 (10 Oct 2022 05:21)  HR: 82 (10 Oct 2022 05:35) (80 - 88)  BP: 117/64 (10 Oct 2022 05:21) (101/65 - 117/64)  BP(mean): --  RR: 18 (10 Oct 2022 05:21) (16 - 18)  SpO2: 95% (10 Oct 2022 05:35) (94% - 100%)    Parameters below as of 10 Oct 2022 05:35  Patient On (Oxygen Delivery Method): nasal cannula w/ humidification,3       [ ] yes [ ] no  PHYSICAL EXAM:    Constitutional: cachectic with muscle wasting,   HEENT: EOMI  Neck: no pain  Back: No CVA tenderness  Respiratory: coarse rhonchi and egophony   Abd: Soft, NT/ND  no organomegally  no hernia  : circumcised penis with dia not draining well, replaced with 24 [place] CBI large number of clots extracted and draining bloody urine  Extremities: no edema  Neurological: Awake alert not oriented x3 knows person and can comprehend situation confused with time.       I&O's Summary    09 Oct 2022 07:01  -  10 Oct 2022 07:00  --------------------------------------------------------  IN: 200 mL / OUT: 1050 mL / NET: -850 mL        LABS:                        6.3    4.52  )-----------( 178      ( 10 Oct 2022 07:33 )             20.1     10-10    142  |  110<H>  |  22  ----------------------------<  99  3.2<L>   |  25  |  1.12    Ca    8.1<L>      10 Oct 2022 07:33  Phos  2.6     10-10  Mg     2.2     10-10    TPro  4.9<L>  /  Alb  1.8<L>  /  TBili  0.5  /  DBili  x   /  AST  20  /  ALT  21  /  AlkPhos  64  10-10          RADIOLOGY & ADDITIONAL STUDIES:   < from: CT Chest No Cont (10.07.22 @ 12:13) >  Tracheobronchial secretions with multifocal pneumonia and small bilateral   pleural effusions.    High density within the left kidney is new from the prior study and   indeterminate. Renal ultrasound recommended for complete evaluation.    < end of copied text >      Plan : 89 year old male with hematuria on lovenox for PE in the setting of Covid PNA, hx of AAA,     acute blood loss anemia- likely due to hematuria  continue CBI  due to PE and clot risk and cardiac hx Medicine to determine if patient requires continued anticoagulation and the risk of cardiac sequelae outweighs the ABLA from hematuria at this time  recommend transfusion  no acute urosurgical intervention at this time.   Discussed with urology attending.

## 2022-10-10 NOTE — PROGRESS NOTE ADULT - SUBJECTIVE AND OBJECTIVE BOX
Patient is a 89y old  Male who presents with a chief complaint of Septic Shock, PNA (07 Oct 2022 00:10)      INTERVAL HPI/OVERNIGHT EVENTS: improved breathing, bleeding from dia overnight, drop in H and H to 6.3    MEDICATIONS  (STANDING):  atorvastatin 20 milliGRAM(s) Oral at bedtime  enoxaparin Injectable 60 milliGRAM(s) SubCutaneous every 12 hours  lactated ringers. 1000 milliLiter(s) (75 mL/Hr) IV Continuous <Continuous>  midodrine 10 milliGRAM(s) Oral every 8 hours  piperacillin/tazobactam IVPB.. 3.375 Gram(s) IV Intermittent every 8 hours  sodium chloride 3%  Inhalation 4 milliLiter(s) Inhalation every 12 hours  tamsulosin 0.4 milliGRAM(s) Oral at bedtime  vancomycin  IVPB 1250 milliGRAM(s) IV Intermittent <User Schedule>    MEDICATIONS  (PRN):  guaiFENesin Oral Liquid (Sugar-Free) 200 milliGRAM(s) Oral every 6 hours PRN Cough      Allergies    No Known Allergies    Intolerances        REVIEW OF SYSTEMS:  CONSTITUTIONAL: No fever, weight loss  EYES: No eye pain, visual disturbances, or discharge  ENMT:  No difficulty hearing, tinnitus, vertigo; No sinus or throat pain  RESPIRATORY: ++ cough,  CARDIOVASCULAR: No chest pain, palpitations, dizziness, or leg swelling  GASTROINTESTINAL: No abdominal or epigastric pain. No nausea, vomiting, or hematemesis; No diarrhea or constipation. No melena or hematochezia.  GENITOURINARY: No dysuria, frequency, hematuria, or incontinence  NEUROLOGICAL: No headaches, memory loss, loss of strength, numbness, or tremors  SKIN: No itching, burning, rashes, or lesions   MUSCULOSKELETAL: No joint pain or swelling; No muscle, back, or extremity pain  PSYCHIATRIC: No depression, anxiety, mood swings, or difficulty sleeping  HEME/LYMPH: No easy bruising, or bleeding gums    Vital Signs Last 24 Hrs  T(C): 36.2 (10 Oct 2022 13:10), Max: 36.7 (10 Oct 2022 11:04)  T(F): 97.2 (10 Oct 2022 13:10), Max: 98 (10 Oct 2022 11:04)  HR: 89 (10 Oct 2022 13:10) (79 - 89)  BP: 106/66 (10 Oct 2022 13:10) (101/65 - 120/74)  BP(mean): --  RR: 16 (10 Oct 2022 13:10) (16 - 18)  SpO2: 96% (10 Oct 2022 13:10) (94% - 100%)    Parameters below as of 10 Oct 2022 13:10  Patient On (Oxygen Delivery Method): nasal cannula  O2 Flow (L/min): 2    PHYSICAL EXAM:  GENERAL: NAD  HEAD:  Atraumatic, Normocephalic  EYES: EOMI, PERRLA, conjunctiva and sclera clear  ENMT: No tonsillar erythema, exudates, or enlargement;   NECK: Supple, Normal thyroid  NERVOUS SYSTEM:  Alert & Oriented X3, Good concentration; Motor Strength 5/5 B/L upper and lower extremities; DTRs 2+ intact and symmetric  CHEST/LUNG: ++ rhonchi, no wheezing, or rubs, no distress   HEART: Regular rate and rhythm; No murmurs, rubs, or gallops  ABDOMEN: Soft, Nontender, Nondistended; Bowel sounds present  EXTREMITIES:  2+ Peripheral Pulses, No clubbing, cyanosis, or edema  LYMPH: No lymphadenopathy noted  SKIN: No rashes or lesions    LABS:                          7.5    4.75  )-----------( 192      ( 09 Oct 2022 07:30 )             23.7     10-09    143  |  111<H>  |  22  ----------------------------<  112<H>  3.7   |  25  |  1.07    Ca    8.4<L>      09 Oct 2022 07:30    TPro  5.1<L>  /  Alb  2.0<L>  /  TBili  0.5  /  DBili  x   /  AST  22  /  ALT  25  /  AlkPhos  68  10-09          CAPILLARY BLOOD GLUCOSE      POCT Blood Glucose.: 116 mg/dL (07 Oct 2022 16:38)      RADIOLOGY & ADDITIONAL TESTS:    Imaging Personally Reviewed:  [ ] YES  [ ] NO    Consultant(s) Notes Reviewed:  [ ] YES  [ ] NO    Care Discussed with Consultants/Other Providers [ ] YES  [ ] NO

## 2022-10-10 NOTE — PROGRESS NOTE ADULT - ASSESSMENT
89 y/o M with PMHx of dyslipidemia, hypertension, BPH and history of a AAA repair with a stent graft in [place], who presented with Sepsis 2/2 PNA    #Septic Shock 2/2 Pneumonia  Sepsis resolved at this time  sputum cx grew ecoli  MRSA nare negative  Meropenem  Midodrine  Patient encourgaed to use sodium chloride #% inhalation to help exporate sputum      #Anemia  Likely from acute blood loss 2/2 traumatic dia placement  Urology recs appreciated  dia replaced  patient placed on CBI  continue to monitor H and H  s/p 2 units PRBC's    #Acute Hypoxic Resp Failure  2/2 PE and PNA  Lovenox for PE    #Dispo  continued inpatient mgmt    Discussed with Dr. Salazar

## 2022-10-11 NOTE — PROGRESS NOTE ADULT - ASSESSMENT
89 y/o M with PMHx of dyslipidemia, hypertension, BPH and history of a AAA repair with a stent graft in [place], who presented with Sepsis 2/2 PNA    #Septic Shock 2/2 Pneumonia  Sepsis resolved at this time  sputum cx grew ecoli ESBL   MRSA nare negative  Meropenem  Midodrine  Patient encourgaed to use sodium chloride #% inhalation to help exporate sputum  Meropenem d/c'd  Ertapenem     #Anemia  Likely from acute blood loss 2/2 traumatic dia placement  Urology recs appreciated  dia replaced  patient placed on CBI  continue to monitor H and H  s/p 2 units PRBC's  will pyridium for pain control prn spasms    #Acute Hypoxic Resp Failure  2/2 PE and PNA  Lovenox for PE    #Dispo  continued inpatient mgmt    Discussed with Dr. Salazar  89 y/o M with PMHx of dyslipidemia, hypertension, BPH and history of a AAA repair with a stent graft in [place], who presented with Sepsis 2/2 PNA    #Septic Shock 2/2 Pneumonia  Sepsis resolved at this time  sputum cx grew ecoli ESBL   MRSA nare negative  Meropenem  Midodrine  Patient encourgaed to use sodium chloride 3% inhalation to help exporate sputum  Meropenem d/c'd  Ertapenem     #Anemia  Likely from acute blood loss 2/2 traumatic dia placement  Urology recs appreciated  dia replaced  patient placed on CBI  continue to monitor H and H  s/p 2 units PRBC's  will pyridium for pain control prn spasms    # hematuria   - improving with cbi   - f/u renal sono    #Acute Hypoxic Resp Failure  2/2 PE and PNA  Lovenox for PE    #Dispo  continued inpatient mgmt    Discussed with Dr. Salazar

## 2022-10-11 NOTE — PROGRESS NOTE ADULT - NSPROGADDITIONALINFOA_GEN_ALL_CORE
hematuria    - improving  with cbi, f.u renal sono     MF pna   - c/w carbapenem. still 88 on ra   - c.w aerobika     pt consult

## 2022-10-11 NOTE — PROGRESS NOTE ADULT - SUBJECTIVE AND OBJECTIVE BOX
UROLOGY PROGRESS NOTE:     Subjective: Patient seen and examined at bedside. No complaints      Objective:  Vital signs  T(F): , Max: 98 (10-10-22 @ 17:58)  HR: 83 (10-11-22 @ 06:23)  BP: 108/65 (10-11-22 @ 06:23)  SpO2: 99% (10-11-22 @ 06:23)  Wt(kg): --    Output     I&O's Detail    10 Oct 2022 07:01  -  11 Oct 2022 07:00  --------------------------------------------------------  IN:    Continuous Bladder Irrigation (mL): 09891 mL    Oral Fluid: 200 mL  Total IN: 06577 mL    OUT:    Continuous Bladder Irrigation (mL): 21474 mL  Total OUT: 62764 mL    Total NET: -2300 mL          Physical Exam:  Gen: no acute distress  Back: no cvat b/l  Abd: soft nt nd  : 3way dia in place, +CBI, urine clear to light pink    Labs:                        8.1    9.90  )-----------( 206      ( 11 Oct 2022 12:20 )             25.5     10-11    143  |  114<H>  |  22  ----------------------------<  170<H>  3.8   |  22  |  1.29    Ca    7.6<L>      11 Oct 2022 06:10  Phos  3.1     10-11  Mg     1.9     10-11    TPro  5.5<L>  /  Alb  2.0<L>  /  TBili  0.4  /  DBili  x   /  AST  22  /  ALT  25  /  AlkPhos  76  10-11    PT/INR - ( 11 Oct 2022 06:10 )   PT: 11.3 sec;   INR: 0.95 ratio         PTT - ( 11 Oct 2022 06:10 )  PTT:34.1 sec

## 2022-10-11 NOTE — PHARMACOTHERAPY INTERVENTION NOTE - COMMENTS
Recommended to switch from meropenem to ertapenem 1 g IV once daily for more narrow spectrum of activity

## 2022-10-11 NOTE — PROGRESS NOTE ADULT - ASSESSMENT
Plan : 89 year old male with hematuria on lovenox for PE in the setting of Covid PNA, hx of AAA,     acute blood loss anemia- likely due to hematuria  continue CBI for now, will consider weaning tomorrow  f/u renal sono  will discuss with urology attending, full recs to follow   Plan : 89 year old male with hematuria on lovenox for PE in the setting of Covid PNA, hx of AAA,     acute blood loss anemia- likely due to hematuria  continue CBI for now, will consider weaning tomorrow  f/u renal sono  discussed with urology attending Govind STEEN

## 2022-10-11 NOTE — PROGRESS NOTE ADULT - SUBJECTIVE AND OBJECTIVE BOX
Patient is a 89y old  Male who presents with a chief complaint of Septic Shock, PNA (07 Oct 2022 00:10)      INTERVAL HPI/OVERNIGHT EVENTS: improved breathing, bleeding from dia overnight, has pain near catheter     MEDICATIONS  (STANDING):  atorvastatin 20 milliGRAM(s) Oral at bedtime  enoxaparin Injectable 60 milliGRAM(s) SubCutaneous every 12 hours  lactated ringers. 1000 milliLiter(s) (75 mL/Hr) IV Continuous <Continuous>  midodrine 10 milliGRAM(s) Oral every 8 hours  piperacillin/tazobactam IVPB.. 3.375 Gram(s) IV Intermittent every 8 hours  sodium chloride 3%  Inhalation 4 milliLiter(s) Inhalation every 12 hours  tamsulosin 0.4 milliGRAM(s) Oral at bedtime  vancomycin  IVPB 1250 milliGRAM(s) IV Intermittent <User Schedule>    MEDICATIONS  (PRN):  guaiFENesin Oral Liquid (Sugar-Free) 200 milliGRAM(s) Oral every 6 hours PRN Cough      Allergies    No Known Allergies    Intolerances        REVIEW OF SYSTEMS:  CONSTITUTIONAL: No fever, weight loss  EYES: No eye pain, visual disturbances, or discharge  ENMT:  No difficulty hearing, tinnitus, vertigo; No sinus or throat pain  RESPIRATORY: ++ cough, +rhonchi   CARDIOVASCULAR: No chest pain, palpitations, dizziness, or leg swelling  GASTROINTESTINAL: No abdominal or epigastric pain. No nausea, vomiting, or hematemesis; No diarrhea or constipation. No melena or hematochezia.  GENITOURINARY: +pain near catheter insertion site   NEUROLOGICAL: No headaches, memory loss, loss of strength, numbness, or tremors  SKIN: No itching, burning, rashes, or lesions   MUSCULOSKELETAL: No joint pain or swelling; No muscle, back, or extremity pain  PSYCHIATRIC: No depression, anxiety, mood swings, or difficulty sleeping  HEME/LYMPH: No easy bruising, or bleeding gums    Vital Signs Last 24 Hrs  T(C): 36.4 (11 Oct 2022 12:00), Max: 36.7 (10 Oct 2022 17:58)  T(F): 97.5 (11 Oct 2022 12:00), Max: 98 (10 Oct 2022 17:58)  HR: 93 (11 Oct 2022 12:30) (78 - 93)  BP: 121/74 (11 Oct 2022 12:30) (108/65 - 136/89)  BP(mean): --  RR: 17 (11 Oct 2022 12:00) (17 - 20)  SpO2: 100% (11 Oct 2022 12:00) (97% - 100%)    Parameters below as of 11 Oct 2022 12:00  Patient On (Oxygen Delivery Method): nasal cannula      PHYSICAL EXAM:  GENERAL: NAD  HEAD:  Atraumatic, Normocephalic  EYES: EOMI, PERRLA, conjunctiva and sclera clear  ENMT: No tonsillar erythema, exudates, or enlargement;   NECK: Supple, Normal thyroid  NERVOUS SYSTEM:  Alert & Oriented X3, Good concentration; Motor Strength 5/5 B/L upper and lower extremities; DTRs 2+ intact and symmetric  CHEST/LUNG: ++ rhonchi, no wheezing, or rubs, no distress   HEART: Regular rate and rhythm; No murmurs, rubs, or gallops  ABDOMEN: Soft, Nontender, Nondistended; Bowel sounds present  EXTREMITIES:  2+ Peripheral Pulses, No clubbing, cyanosis, or edema  LYMPH: No lymphadenopathy noted  SKIN: No rashes or lesions    LABS:                          7.5    4.75  )-----------( 192      ( 09 Oct 2022 07:30 )             23.7     10-09    143  |  111<H>  |  22  ----------------------------<  112<H>  3.7   |  25  |  1.07    Ca    8.4<L>      09 Oct 2022 07:30    TPro  5.1<L>  /  Alb  2.0<L>  /  TBili  0.5  /  DBili  x   /  AST  22  /  ALT  25  /  AlkPhos  68  10-09          CAPILLARY BLOOD GLUCOSE      POCT Blood Glucose.: 116 mg/dL (07 Oct 2022 16:38)      RADIOLOGY & ADDITIONAL TESTS:    Imaging Personally Reviewed:  [ ] YES  [ ] NO    Consultant(s) Notes Reviewed:  [ ] YES  [ ] NO    Care Discussed with Consultants/Other Providers [ ] YES  [ ] NO Patient is a 89y old  Male who presents with a chief complaint of Septic Shock, PNA (07 Oct 2022 00:10)      INTERVAL HPI/OVERNIGHT EVENTS: improved breathing, hematuria clearing     MEDICATIONS  (STANDING):  atorvastatin 20 milliGRAM(s) Oral at bedtime  enoxaparin Injectable 60 milliGRAM(s) SubCutaneous every 12 hours  lactated ringers. 1000 milliLiter(s) (75 mL/Hr) IV Continuous <Continuous>  midodrine 10 milliGRAM(s) Oral every 8 hours  piperacillin/tazobactam IVPB.. 3.375 Gram(s) IV Intermittent every 8 hours  sodium chloride 3%  Inhalation 4 milliLiter(s) Inhalation every 12 hours  tamsulosin 0.4 milliGRAM(s) Oral at bedtime  vancomycin  IVPB 1250 milliGRAM(s) IV Intermittent <User Schedule>    MEDICATIONS  (PRN):  guaiFENesin Oral Liquid (Sugar-Free) 200 milliGRAM(s) Oral every 6 hours PRN Cough      Allergies    No Known Allergies    Intolerances        REVIEW OF SYSTEMS:  CONSTITUTIONAL: No fever, weight loss  EYES: No eye pain, visual disturbances, or discharge  ENMT:  No difficulty hearing, tinnitus, vertigo; No sinus or throat pain  RESPIRATORY: ++ cough, +rhonchi   CARDIOVASCULAR: No chest pain, palpitations, dizziness, or leg swelling  GASTROINTESTINAL: No abdominal or epigastric pain. No nausea, vomiting, or hematemesis; No diarrhea or constipation. No melena or hematochezia.  GENITOURINARY: +pain near catheter insertion site   NEUROLOGICAL: No headaches, memory loss, loss of strength, numbness, or tremors  SKIN: No itching, burning, rashes, or lesions   MUSCULOSKELETAL: No joint pain or swelling; No muscle, back, or extremity pain  PSYCHIATRIC: No depression, anxiety, mood swings, or difficulty sleeping  HEME/LYMPH: No easy bruising, or bleeding gums    Vital Signs Last 24 Hrs  T(C): 36.4 (11 Oct 2022 12:00), Max: 36.7 (10 Oct 2022 17:58)  T(F): 97.5 (11 Oct 2022 12:00), Max: 98 (10 Oct 2022 17:58)  HR: 93 (11 Oct 2022 12:30) (78 - 93)  BP: 121/74 (11 Oct 2022 12:30) (108/65 - 136/89)  BP(mean): --  RR: 17 (11 Oct 2022 12:00) (17 - 20)  SpO2: 100% (11 Oct 2022 12:00) (97% - 100%)    Parameters below as of 11 Oct 2022 12:00  Patient On (Oxygen Delivery Method): nasal cannula      PHYSICAL EXAM:  GENERAL: NAD  HEAD:  Atraumatic, Normocephalic  EYES: EOMI, PERRLA, conjunctiva and sclera clear  ENMT: No tonsillar erythema, exudates, or enlargement;   NECK: Supple, Normal thyroid  NERVOUS SYSTEM:  Alert & Oriented X3, Good concentration; Motor Strength 5/5 B/L upper and lower extremities; DTRs 2+ intact and symmetric  CHEST/LUNG: ++ rhonchi, no wheezing, or rubs, no distress   HEART: Regular rate and rhythm; No murmurs, rubs, or gallops  ABDOMEN: Soft, Nontender, Nondistended; Bowel sounds present  EXTREMITIES:  2+ Peripheral Pulses, No clubbing, cyanosis, or edema  LYMPH: No lymphadenopathy noted  SKIN: No rashes or lesions    LABS:                          7.5    4.75  )-----------( 192      ( 09 Oct 2022 07:30 )             23.7     10-09    143  |  111<H>  |  22  ----------------------------<  112<H>  3.7   |  25  |  1.07    Ca    8.4<L>      09 Oct 2022 07:30    TPro  5.1<L>  /  Alb  2.0<L>  /  TBili  0.5  /  DBili  x   /  AST  22  /  ALT  25  /  AlkPhos  68  10-09          CAPILLARY BLOOD GLUCOSE      POCT Blood Glucose.: 116 mg/dL (07 Oct 2022 16:38)      RADIOLOGY & ADDITIONAL TESTS:    Imaging Personally Reviewed:  [ ] YES  [ ] NO    Consultant(s) Notes Reviewed:  [ ] YES  [ ] NO    Care Discussed with Consultants/Other Providers [ ] YES  [ ] NO

## 2022-10-12 PROBLEM — I50.9 HEART FAILURE, UNSPECIFIED: Chronic | Status: ACTIVE | Noted: 2022-01-01

## 2022-10-12 NOTE — PROGRESS NOTE ADULT - ASSESSMENT
89 y/o M with PMHx of dyslipidemia, hypertension, BPH and history of a AAA repair with a stent graft in [place], who presented with Sepsis 2/2 PNA    #Septic Shock 2/2 Pneumonia  Sepsis resolved at this time  sputum cx grew ecoli ESBL   MRSA nare negative  on Ertapenem  Midodrine-- taper to off bp is atable  Patient encourgaed to use sodium chloride 3% inhalation to help exporate sputum      #Anemia  Likely from acute blood loss 2/2 traumatic dia placement  Urology recs appreciated  on CBI managed by urology   transfuse as needed already s/p 2 units on 10/10/2022      # hematuria   - improving with cbi   - f/u renal sono    #Acute Hypoxic Resp Failure  2/2 PE and PNA  Lovenox for PE      #mild hypophosphatemia :   will be replaced     #Dispo  continued inpatient mgmt     89 y/o M with PMHx of dyslipidemia, hypertension, BPH and history of a AAA repair with a stent graft in [place], who presented with Sepsis 2/2 PNA    #Septic Shock 2/2 Pneumonia  Sepsis resolved at this time  sputum cx grew ecoli ESBL   MRSA nare negative  on Ertapenem  Midodrine-- taper to off bp is atable  Patient encourgaed to use sodium chloride 3% inhalation to help exporate sputum      #Anemia  Likely from acute blood loss 2/2 traumatic dia placement  Urology recs appreciated  on CBI managed by urology   transfuse as needed already s/p 2 units on 10/10/2022      # hematuria   - improving with cbi   - f/u renal sono    #Acute Hypoxic Resp Failure  2/2 PE and PNA  Lovenox for PE      #mild hypophosphatemia :   will be replaced     #Dispo  continued inpatient mgmt   d/w daughter at bedside

## 2022-10-12 NOTE — PROGRESS NOTE ADULT - ASSESSMENT
Plan : 89 year old male with hematuria on lovenox for PE in the setting of Covid PNA, hx of AAA,     trend h/h  CBI titrated down, monitor output

## 2022-10-12 NOTE — PROGRESS NOTE ADULT - SUBJECTIVE AND OBJECTIVE BOX
SURGERY PROGRESS HPI:  Pt seen and examined at bedside.  CBI is running with clear output.      Vital Signs Last 24 Hrs  T(C): 36.4 (12 Oct 2022 11:02), Max: 36.8 (11 Oct 2022 17:26)  T(F): 97.5 (12 Oct 2022 11:02), Max: 98.3 (12 Oct 2022 06:06)  HR: 101 (12 Oct 2022 14:26) (81 - 103)  BP: 102/66 (12 Oct 2022 14:26) (102/66 - 126/68)  BP(mean): --  RR: 17 (12 Oct 2022 11:02) (17 - 20)  SpO2: 98% (12 Oct 2022 11:02) (94% - 99%)    Parameters below as of 12 Oct 2022 11:02  Patient On (Oxygen Delivery Method): nasal cannula          PHYSICAL EXAM:    GENERAL: NAD  HEAD:  Atraumatic, Normocephalic  CHEST/LUNG: Clear to ausculation, bilaterally   HEART: RRR S1S2  ABDOMEN: non distended, +BS, soft, non tender, no guarding  EXTREMITIES:  calf soft, non tender b/l    I&O's Detail    11 Oct 2022 07:01  -  12 Oct 2022 07:00  --------------------------------------------------------  IN:    Continuous Bladder Irrigation (mL): 72965 mL    dextrose 5% + sodium chloride 0.45%: 100 mL  Total IN: 59487 mL    OUT:    Continuous Bladder Irrigation (mL): 80769 mL  Total OUT: 36393 mL    Total NET: 14344 mL      12 Oct 2022 07:01  -  12 Oct 2022 16:28  --------------------------------------------------------  IN:    Continuous Bladder Irrigation (mL): 20132 mL  Total IN: 88755 mL    OUT:    Continuous Bladder Irrigation (mL): 18535 mL  Total OUT: 78745 mL    Total NET: -2100 mL          LABS:                        7.2    5.65  )-----------( 199      ( 12 Oct 2022 08:10 )             23.4     10-12    145  |  117<H>  |  20  ----------------------------<  105<H>  3.5   |  21<L>  |  0.96    Ca    7.9<L>      12 Oct 2022 08:10  Phos  2.3     10-12  Mg     2.1     10-12    TPro  5.0<L>  /  Alb  1.7<L>  /  TBili  0.4  /  DBili  x   /  AST  24  /  ALT  22  /  AlkPhos  67  10-12    PT/INR - ( 11 Oct 2022 06:10 )   PT: 11.3 sec;   INR: 0.95 ratio         PTT - ( 11 Oct 2022 06:10 )  PTT:34.1 sec

## 2022-10-12 NOTE — PROGRESS NOTE ADULT - SUBJECTIVE AND OBJECTIVE BOX
Patient is a 89y old  Male who presents with a chief complaint of Septic Shock, PNA (07 Oct 2022 00:10)      INTERVAL HPI/OVERNIGHT EVENTS: improved breathing, hematuria clearing       MEDICATIONS  (STANDING):  atorvastatin 20 milliGRAM(s) Oral at bedtime  dextrose 5% + sodium chloride 0.45%. 1000 milliLiter(s) (100 mL/Hr) IV Continuous <Continuous>  enoxaparin Injectable 60 milliGRAM(s) SubCutaneous every 12 hours  ertapenem  IVPB 1000 milliGRAM(s) IV Intermittent every 24 hours  midodrine 5 milliGRAM(s) Oral every 8 hours  sodium chloride 3%  Inhalation 4 milliLiter(s) Inhalation every 12 hours  tamsulosin 0.4 milliGRAM(s) Oral at bedtime    MEDICATIONS  (PRN):  guaiFENesin Oral Liquid (Sugar-Free) 200 milliGRAM(s) Oral every 6 hours PRN Cough  morphine  - Injectable 2 milliGRAM(s) IV Push every 6 hours PRN Moderate Pain (4 - 6)  phenazopyridine 100 milliGRAM(s) Oral every 8 hours PRN bladder spasms    Allergies    No Known Allergies    Intolerances    Vital Signs Last 24 Hrs  T(C): 36.8 (12 Oct 2022 06:06), Max: 36.8 (11 Oct 2022 17:26)  T(F): 98.3 (12 Oct 2022 06:06), Max: 98.3 (12 Oct 2022 06:06)  HR: 84 (12 Oct 2022 06:39) (81 - 102)  BP: 108/72 (12 Oct 2022 06:06) (103/71 - 126/68)  BP(mean): --  RR: 20 (12 Oct 2022 06:06) (17 - 20)  SpO2: 98% (12 Oct 2022 06:39) (94% - 100%)    Parameters below as of 12 Oct 2022 06:39  Patient On (Oxygen Delivery Method): nasal cannula        PHYSICAL EXAM:  GENERAL: NAD  HEAD:  Atraumatic, Normocephalic  EYES: EOMI, PERRLA, conjunctiva and sclera clear  ENMT: No tonsillar erythema, exudates, or enlargement;   NECK: Supple,   NERVOUS SYSTEM:  Alert & Oriented X3, Good concentration; Motor Strength 5/5 B/L upper and lower extremities; DTRs 2+ intact and symmetric  CHEST/LUNG: ++ rhonchi, no wheezing, or rubs, no distress   HEART: Regular rate and rhythm; No murmurs, rubs, or gallops  ABDOMEN: Soft, Nontender, Nondistended; Bowel sounds present  EXTREMITIES:  2+ Peripheral Pulses, No clubbing, cyanosis, or edema  SKIN: No rashes or lesions    LABS:                        7.2    5.65  )-----------( 199      ( 12 Oct 2022 08:10 )             23.4   10-12    145  |  117<H>  |  20  ----------------------------<  105<H>  3.5   |  21<L>  |  0.96    Ca    7.9<L>      12 Oct 2022 08:10  Phos  2.3     10-12  Mg     2.1     10-12    TPro  5.0<L>  /  Alb  1.7<L>  /  TBili  0.4  /  DBili  x   /  AST  24  /  ALT  22  /  AlkPhos  67  10-12        CAPILLARY BLOOD GLUCOSE        RADIOLOGY & ADDITIONAL TESTS:    Imaging Personally Reviewed:  [ ] YES  [ ] NO    Consultant(s) Notes Reviewed:  [ ] YES  [ ] NO    Care Discussed with Consultants/Other Providers [ ] YES  [ ] NO Patient is a 89y old  Male who presents with a chief complaint of Septic Shock, PNA (07 Oct 2022 00:10)      INTERVAL HPI/OVERNIGHT EVENTS: improved breathing, hematuria clearing       MEDICATIONS  (STANDING):  atorvastatin 20 milliGRAM(s) Oral at bedtime  dextrose 5% + sodium chloride 0.45%. 1000 milliLiter(s) (100 mL/Hr) IV Continuous <Continuous>  enoxaparin Injectable 60 milliGRAM(s) SubCutaneous every 12 hours  ertapenem  IVPB 1000 milliGRAM(s) IV Intermittent every 24 hours  midodrine 5 milliGRAM(s) Oral every 8 hours  sodium chloride 3%  Inhalation 4 milliLiter(s) Inhalation every 12 hours  tamsulosin 0.4 milliGRAM(s) Oral at bedtime    MEDICATIONS  (PRN):  guaiFENesin Oral Liquid (Sugar-Free) 200 milliGRAM(s) Oral every 6 hours PRN Cough  morphine  - Injectable 2 milliGRAM(s) IV Push every 6 hours PRN Moderate Pain (4 - 6)  phenazopyridine 100 milliGRAM(s) Oral every 8 hours PRN bladder spasms    Allergies    No Known Allergies    Intolerances    Vital Signs Last 24 Hrs  T(C): 36.8 (12 Oct 2022 06:06), Max: 36.8 (11 Oct 2022 17:26)  T(F): 98.3 (12 Oct 2022 06:06), Max: 98.3 (12 Oct 2022 06:06)  HR: 84 (12 Oct 2022 06:39) (81 - 102)  BP: 108/72 (12 Oct 2022 06:06) (103/71 - 126/68)  BP(mean): --  RR: 20 (12 Oct 2022 06:06) (17 - 20)  SpO2: 98% (12 Oct 2022 06:39) (94% - 100%)    Parameters below as of 12 Oct 2022 06:39  Patient On (Oxygen Delivery Method): nasal cannula        PHYSICAL EXAM:  GENERAL: NAD  HEAD:  Atraumatic, Normocephalic  EYES: EOMI, PERRLA, conjunctiva and sclera clear  ENMT: No tonsillar erythema, exudates, or enlargement;   NECK: Supple,   NERVOUS SYSTEM:  Alert & Oriented X3, Good concentration; Motor Strength 4-/5 B/L upper and lower extremities; DTRs 2+ intact and symmetric  CHEST/LUNG: ++ rhonchi, no wheezing, or rubs, no distress   HEART: Regular rate and rhythm; No murmurs, rubs, or gallops  ABDOMEN: Soft, Nontender, Nondistended; Bowel sounds present  EXTREMITIES:  2+ Peripheral Pulses, No clubbing, cyanosis, or edema  SKIN: No rashes or lesions    LABS:                        7.2    5.65  )-----------( 199      ( 12 Oct 2022 08:10 )             23.4   10-12    145  |  117<H>  |  20  ----------------------------<  105<H>  3.5   |  21<L>  |  0.96    Ca    7.9<L>      12 Oct 2022 08:10  Phos  2.3     10-12  Mg     2.1     10-12    TPro  5.0<L>  /  Alb  1.7<L>  /  TBili  0.4  /  DBili  x   /  AST  24  /  ALT  22  /  AlkPhos  67  10-12        CAPILLARY BLOOD GLUCOSE        RADIOLOGY & ADDITIONAL TESTS:    Imaging Personally Reviewed:  [ ] YES  [ ] NO    Consultant(s) Notes Reviewed:  [ ] YES  [ ] NO    Care Discussed with Consultants/Other Providers [ ] YES  [ ] NO

## 2022-10-13 NOTE — PROGRESS NOTE ADULT - SUBJECTIVE AND OBJECTIVE BOX
Patient seen and examined bedside resting comfortably.  No complaints offered.   3 way dia with CBI running in SITU with blood tinged output in tubing    T(F): 98.1 (10-13-22 @ 15:00), Max: 99.2 (10-12-22 @ 20:42)  HR: 96 (10-13-22 @ 15:00) (78 - 98)  BP: 119/66 (10-13-22 @ 15:00) (101/66 - 119/66)  RR: 18 (10-13-22 @ 15:00) (17 - 18)  SpO2: 96% (10-13-22 @ 15:00) (94% - 98%)  Wt(kg): --  CAPILLARY BLOOD GLUCOSE          PHYSICAL EXAM:  General: NAD, alert and awake  HEENT: NCAT, EOMI, conjunctiva clear  Chest: nonlabored respirations, good inspiratory effort  Abdomen: soft, NTND.   Extremities: no pedal edema or calf tenderness noted   : No CVA or SP tenderness. 3 way dia with CBI running in SITU with blood tinged output in tubing    LABS:                        6.1    4.77  )-----------( 194      ( 13 Oct 2022 07:25 )             19.5   10-13    147<H>  |  118<H>  |  19  ----------------------------<  102<H>  3.4<L>   |  22  |  0.91    Ca    8.2<L>      13 Oct 2022 07:25  Phos  1.9     10-13  Mg     2.2     10-13    TPro  5.0<L>  /  Alb  1.7<L>  /  TBili  0.4  /  DBili  x   /  AST  24  /  ALT  22  /  AlkPhos  67  10-12    I&O's Detail    12 Oct 2022 07:01  -  13 Oct 2022 07:00  --------------------------------------------------------  IN:    Continuous Bladder Irrigation (mL): 23485 mL    IV PiggyBack: 100 mL    Oral Fluid: 90 mL  Total IN: 63511 mL    OUT:    Continuous Bladder Irrigation (mL): 50391 mL  Total OUT: 89899 mL    Total NET: -1990 mL      13 Oct 2022 07:01  -  13 Oct 2022 16:21  --------------------------------------------------------  IN:    Continuous Bladder Irrigation (mL): 6000 mL    PRBCs (Packed Red Blood Cells): 300 mL  Total IN: 6300 mL    OUT:    Continuous Bladder Irrigation (mL): 6000 mL  Total OUT: 6000 mL    Total NET: 300 mL

## 2022-10-13 NOTE — PROGRESS NOTE ADULT - SUBJECTIVE AND OBJECTIVE BOX
Patient is a 89y old  Male who presents with a chief complaint of Septic Shock, PNA (07 Oct 2022 00:10)      INTERVAL HPI/OVERNIGHT EVENTS: improved breathing, hematuria clearing       MEDICATIONS  (STANDING):  ALBUTerol    90 MICROgram(s) HFA Inhaler 2 Puff(s) Inhalation every 6 hours  atorvastatin 20 milliGRAM(s) Oral at bedtime  dextrose 5% + sodium chloride 0.45%. 1000 milliLiter(s) (100 mL/Hr) IV Continuous <Continuous>  enoxaparin Injectable 60 milliGRAM(s) SubCutaneous every 12 hours  ertapenem  IVPB 1000 milliGRAM(s) IV Intermittent every 24 hours  midodrine 5 milliGRAM(s) Oral every 8 hours  potassium chloride   Powder 20 milliEquivalent(s) Oral once  potassium phosphate / sodium phosphate Powder (PHOS-NaK) 1 Packet(s) Oral every 4 hours  sodium chloride 3%  Inhalation 4 milliLiter(s) Inhalation every 12 hours  tamsulosin 0.4 milliGRAM(s) Oral at bedtime    MEDICATIONS  (PRN):  guaiFENesin Oral Liquid (Sugar-Free) 200 milliGRAM(s) Oral every 6 hours PRN Cough  morphine  - Injectable 2 milliGRAM(s) IV Push every 6 hours PRN Moderate Pain (4 - 6)  phenazopyridine 100 milliGRAM(s) Oral every 8 hours PRN bladder spasms      Allergies    No Known Allergies    Intolerances  Vital Signs Last 24 Hrs  T(C): 36.9 (13 Oct 2022 06:29), Max: 37.3 (12 Oct 2022 20:42)  T(F): 98.5 (13 Oct 2022 06:29), Max: 99.2 (12 Oct 2022 20:42)  HR: 88 (13 Oct 2022 06:29) (78 - 103)  BP: 109/73 (13 Oct 2022 06:29) (102/66 - 118/67)  BP(mean): --  RR: 18 (13 Oct 2022 06:29) (17 - 18)  SpO2: 94% (13 Oct 2022 06:29) (94% - 98%)    Parameters below as of 13 Oct 2022 06:29  Patient On (Oxygen Delivery Method): nasal cannula        PHYSICAL EXAM:  GENERAL: NAD  HEAD:  Atraumatic, Normocephalic  EYES: EOMI, PERRLA, conjunctiva and sclera clear  ENMT: No tonsillar erythema, exudates, or enlargement;   NECK: Supple,   NERVOUS SYSTEM:  Alert & Oriented X3, Good concentration; Motor Strength 4-/5 B/L upper and lower extremities; DTRs 2+ intact and symmetric  CHEST/LUNG: ++ rhonchi, no wheezing, or rubs, no distress   HEART: Regular rate and rhythm; No murmurs, rubs, or gallops  ABDOMEN: Soft, Nontender, Nondistended; Bowel sounds present  EXTREMITIES:  2+ Peripheral Pulses, No clubbing, cyanosis, or edema  SKIN: No rashes or lesions    LABS:                                        6.1    4.77  )-----------( 194      ( 13 Oct 2022 07:25 )             19.5   10-13    147<H>  |  118<H>  |  19  ----------------------------<  102<H>  3.4<L>   |  22  |  0.91    Ca    8.2<L>      13 Oct 2022 07:25  Phos  1.9     10-13  Mg     2.2     10-13    TPro  5.0<L>  /  Alb  1.7<L>  /  TBili  0.4  /  DBili  x   /  AST  24  /  ALT  22  /  AlkPhos  67  10-12        CAPILLARY BLOOD GLUCOSE        RADIOLOGY & ADDITIONAL TESTS:    Imaging Personally Reviewed:  [ ] YES  [ ] NO    Consultant(s) Notes Reviewed:  [ ] YES  [ ] NO    Care Discussed with Consultants/Other Providers [ ] YES  [ ] NO

## 2022-10-13 NOTE — PROGRESS NOTE ADULT - ASSESSMENT
89 year old male with hematuria on lovenox for PE in the setting of Covid PNA, hx of AAA,     3 way Mccormack with CBI running in SITU with blood tinged output in tubing, CBI titrated down, monitor output. Continue Lovenox.   f/u post transfusion cbc

## 2022-10-13 NOTE — PROGRESS NOTE ADULT - ASSESSMENT
88 y/o M with PMHx of dyslipidemia, hypertension, BPH and history of a AAA repair with a stent graft in [place], who presented with Sepsis 2/2 PNA    #Septic Shock 2/2 Pneumonia  Sepsis resolved at this time  sputum cx grew ecoli ESBL   MRSA nare negative  on Ertapenem  Midodrine-- taper to off bp is atable  Patient encourgaed to use sodium chloride 3% inhalation to help exporate sputum  10/13/2022 continue with chest pt and inhalers ..      #Anemia  Likely from acute blood loss 2/2 traumatic dia placement  Urology recs appreciated  on CBI managed by urology   transfuse as needed already s/p 2 units on 10/10/2022  10/13/2022 now requiring another 2 units of blood may need to consider holding therapeutic loveneox.       # hematuria   - improving with cbi   - f/u renal sono  10/13/2022 renal US as above need urology to comment. may also need to consider holding lovenox .    #Acute Hypoxic Resp Failure  2/2 PE and PNA  Lovenox for PE      #mild hypophosphatemia :   will be replaced    mild hypokalemia - will be replaced    #Dispo  continued inpatient mgmt   d/w daughter at bedside she understands risk and benefits especially in regards to holding lovenox or continuing   the same      89 y/o M with PMHx of dyslipidemia, hypertension, BPH and history of a AAA repair with a stent graft in [place], who presented with Sepsis 2/2 PNA    #Septic Shock 2/2 Pneumonia  Sepsis resolved at this time  sputum cx grew ecoli ESBL   MRSA nare negative  on Ertapenem  Midodrine-- taper to off bp is atable  Patient encourgaed to use sodium chloride 3% inhalation to help exporate sputum  10/13/2022 continue with chest pt and inhalers ..      #Anemia  Likely from acute blood loss 2/2 traumatic dia placement  Urology recs appreciated  on CBI managed by urology   transfuse as needed already s/p 2 units on 10/10/2022  10/13/2022 now requiring another 2 units of blood may need to consider holding therapeutic loveneox.       # hematuria   - improving with cbi   - f/u renal sono  10/13/2022 renal US as above need urology to comment. may also need to consider holding lovenox .    #Acute Hypoxic Resp Failure  2/2 PE and PNA  Lovenox for PE  10/13/2022 sounds more rhonchi getting cxr , chest pt , inhalers , lasix and speech swallow eval       #mild hypophosphatemia :   will be replaced    mild hypokalemia - will be replaced    #Dispo  continued inpatient mgmt   d/w daughter at bedside she understands risk and benefits especially in regards to holding lovenox or continuing   the same

## 2022-10-14 NOTE — DIETITIAN INITIAL EVALUATION ADULT - OTHER CALCULATIONS
Ht (cm):  177.8  Wt (kg):   72 (10/14)  BMI:  22.8  IBW:  75.5 kg  %IBW:  95%  UBW: 63.6 kg  %UBW: 114%

## 2022-10-14 NOTE — DIETITIAN INITIAL EVALUATION ADULT - PERTINENT LABORATORY DATA
10-14    150<H>  |  119<H>  |  21  ----------------------------<  137<H>  3.8   |  25  |  0.84    Ca    8.6      14 Oct 2022 07:30  Phos  2.3     10-14  Mg     2.0     10-14

## 2022-10-14 NOTE — DIETITIAN INITIAL EVALUATION ADULT - PERTINENT MEDS FT
Lovenox, Invanz, D5 + 1/2 NS @ 100 ml/hr, Midodrine, Morphine, Pyridium, Robitussin SF, Lipitor, Flomax

## 2022-10-14 NOTE — DIETITIAN INITIAL EVALUATION ADULT - NS FNS DIET ORDER
Diet, Soft and Bite Sized:   DASH/TLC {Sodium & Cholesterol Restricted}  Moderately Thick Liquids (MODTHICKLIQS)  Supplement Feeding Modality:  Oral  Ensure Enlive Cans or Servings Per Day:  1       Frequency:  Two Times a day (10-13-22 @ 20:12)

## 2022-10-14 NOTE — DIETITIAN INITIAL EVALUATION ADULT - ORAL NUTRITION SUPPLEMENTS
Ensure Pudding x 2/day (provides 340 kcal, 8 g protein) + Magic Cup x 1/day (provides 290 kcal, 9 g protein)

## 2022-10-14 NOTE — PROGRESS NOTE ADULT - SUBJECTIVE AND OBJECTIVE BOX
Patient is a 89y old  Male who presents with a chief complaint of SEPTIC SHOCK; PNA; COVID-19; HISTORY OF ABDOMINAL AORTIC     (14 Oct 2022 14:45)    HPI:  89 year old man with a PMHx of dyslipidemia, hypertension, BPH and history of a AAA repair with a stent graft in [place]. Patient was supposed to have elective repair of his AAA but deemed not fit to proceed due to recent illness. Of Note, patient was recently hospitalized at [facility] from 9/29-10/3 for covid PNA, segmental and subsegmental emboli involving the left lower lobe and acute nonocclusive thrombus in the left mid femoral vein.     Patient presented to the ED with daughter with complaints of with fever 102.7( at home), non productive cough and weakness. In the ED, Vitals significant for temp 101.1, BP: 91/54, HR: 98. O2 Sat: 86 %. Patient was placed on 6 L NC. On work up, WBC 13, neutrophil 91 %, albumin 2.4, lactate 0.9. ABG: PaO2 68. CXR with opacity on the left. Patient denies chest pain, palpitations, abdominal pain, headache, dizziness, shortness of breath, syncope or worsening edema. Patient got 1.5 L of IV fluids in the ED and was started on norepinephrine drip for BP support. Patient admitted to the ICU for further management.  (07 Oct 2022 00:10)    SUBJECTIVE & OBJECTIVE: Pt seen and examined at bedside.   PHYSICAL EXAM:  T(C): 36.6 (10-15-22 @ 05:10), Max: 37 (10-14-22 @ 17:00)  HR: 74 (10-15-22 @ 05:10) (74 - 99)  BP: 101/68 (10-15-22 @ 05:10) (101/68 - 123/69)  RR: 20 (10-15-22 @ 05:10) (18 - 20)  SpO2: 98% (10-15-22 @ 05:10) (92% - 98%) Daily     Daily I&O's Detail    14 Oct 2022 07:01  -  15 Oct 2022 07:00  --------------------------------------------------------  IN:    Continuous Bladder Irrigation (mL): 65653 mL  Total IN: 46574 mL    OUT:    Continuous Bladder Irrigation (mL): 17348 mL  Total OUT: 52647 mL    Total NET: 950 mL      15 Oct 2022 07:01  -  15 Oct 2022 08:57  --------------------------------------------------------  IN:    Continuous Bladder Irrigation (mL): 1500 mL  Total IN: 1500 mL    OUT:    Continuous Bladder Irrigation (mL): 800 mL  Total OUT: 800 mL    Total NET: 700 mL        GENERAL: pale, thin, and chronically ill appearing  HEAD:  Atraumatic, Normocephalic  EYES: EOMI, PERRLA, conjunctiva and sclera clear  ENMT: Moist mucous membranes  NECK: Supple, No JVD  NERVOUS SYSTEM:  Alert, Motor Strength 3/5 B/L upper and lower extremities; DTRs 2+ intact and symmetric  CHEST/LUNG: Clear to auscultation bilaterally; No rales, rhonchi, wheezing, or rubs  HEART: Regular rate and rhythm; No murmurs, rubs, or gallops  ABDOMEN: Soft, Nontender, Nondistended; Bowel sounds present  EXTREMITIES:  2+ Peripheral Pulses, No clubbing, cyanosis, or edema  : jonathan hematuria  LABS:                        8.5    6.62  )-----------( 193      ( 14 Oct 2022 07:30 )             26.4   CAPILLARY BLOOD GLUCOSE      ABG - ( 14 Oct 2022 10:55 )  pH, Arterial: 7.45  pH, Blood: x     /  pCO2: 34    /  pO2: 89    / HCO3: 24    / Base Excess: 0.1   /  SaO2: 98.8              RECENT CULTURES:   RADIOLOGY & ADDITIONAL TESTS:

## 2022-10-14 NOTE — DIETITIAN INITIAL EVALUATION ADULT - OTHER INFO
Unable to interview pt due to confused status + isolation precautions being maintained for COVID.  Per discussion c daughter Krystal via phone ([phone]) pt lives c her & consumes a regular diet, regular consistency at home; however daughter did report that pt was coughing after consuming Ensure supplement when she visited him.  Swallow eval pending as MD concerned re risk of aspiration; downgraded diet rx for safety until SLP can recommend appropriate food & fluid consistencies;   No reports of any N/V/C/D.  Daughter reports pt UBW at approx. 140# which what it was at admission; presently at 160#?  fluid retention or inaccurate wts?

## 2022-10-14 NOTE — PHYSICAL THERAPY INITIAL EVALUATION ADULT - PERTINENT HX OF CURRENT PROBLEM, REHAB EVAL
Patient is an 89 y/o male admitted to [facility] due to septic shock, PNA, Covid-19. Pt received 2 units of PRBC on 10/13.

## 2022-10-14 NOTE — PHYSICAL THERAPY INITIAL EVALUATION ADULT - GENERAL OBSERVATIONS, REHAB EVAL
Chart (EMR) reviewed. +Covid-19 precautions. Received supine c HOB elevated, NAD. +CBI and dia cath intact, +O2 via nasal cannula, +heplock. Alert. Ox4. Able to follow multistep commands/directions.

## 2022-10-14 NOTE — PHYSICAL THERAPY INITIAL EVALUATION ADULT - ADDITIONAL COMMENTS
Patient lives c daughter and son-in-law in a pvt house c 6 entry steps c B/L rails(far apart), inside has 6 steps c B/L rails(reachable). Independent c all ADL's and ambulation with rolling walker.

## 2022-10-14 NOTE — PROGRESS NOTE ADULT - ASSESSMENT
89 y/o M with PMHx of dyslipidemia, hypertension, BPH and history of a AAA repair with a stent graft in [place], who presented with Sepsis 2/2 PNA    Problem/Plan-1:  Problem: Septic Shock 2/2 Pneumonia  Sepsis resolved at this time  sputum cx grew ecoli ESBL   MRSA nare negative  on Ertapenem  Midodrine-- taper to off bp is atable  Patient encourgaed to use sodium chloride 3% inhalation to help exporate sputum  10/13/2022 continue with chest pt and inhalers ..      Problem/Plan-2:  Problem: Anemia  Likely from acute blood loss 2/2 traumatic dia placement  Urology recs appreciated  on CBI managed by urology   transfuse as needed already s/p 2 units on 10/10/2022  10/13/2022 now requiring another 2 units of blood may need to consider holding therapeutic loveneox.   follow hgb and consider d/c AC      Problem/Plan-3:  Problem: Hematuria   - improving with cbi   - f/u renal sono  10/13/2022 renal US as above need urology to comment. may also need to consider holding lovenox .    Problem/Plan-4:  Problem:  Acute Hypoxic Resp Failure  2/2 PE and PNA  Lovenox for PE  10/13/2022 sounds more rhonchi getting cxr , chest pt , inhalers , lasix and speech swallow eval       Problem/Plan-5:  Problem: mild hypophosphatemia :   will be replaced    mild hypokalemia - will be replaced    #Dispo  continued inpatient mgmt   d/w daughter at bedside she understands risk and benefits especially in regards to holding lovenox or continuing   the same

## 2022-10-15 NOTE — PROGRESS NOTE ADULT - SUBJECTIVE AND OBJECTIVE BOX
Patient seen and  examined at bedside resting comfortably.  Sleepy but offers no complaints.  Mccormack in place with CBI running.  Tmax 98.1     Vital Signs Last 24 Hrs  T(F): 97.4 (10-15-22 @ 11:19), Max: 98.1 (10-14-22 @ 23:20)  HR: 89 (10-15-22 @ 11:40)  BP: 119/79 (10-15-22 @ 11:19)  RR: 20 (10-15-22 @ 11:19)  SpO2: 99% (10-15-22 @ 11:40)    PHYSICAL EXAM:  GENERAL: Alert, NAD  CHEST/LUNG: Clear to auscultation bilaterally, respirations nonlabored  HEART: Regular rate and rhythm; S1 & S2 appreciated  ABDOMEN: + Bowel sounds, soft, Nontender, Nondistended  : no suprapubic tenderness or distention. Mccormack catheter with clear urine with light pink tinge   EXTREMITIES:  no calf tenderness, No edema    I&O's Detail    14 Oct 2022 07:01  -  15 Oct 2022 07:00  --------------------------------------------------------  IN:    Continuous Bladder Irrigation (mL): 77552 mL  Total IN: 44920 mL    OUT:    Continuous Bladder Irrigation (mL): 84420 mL  Total OUT: 03895 mL    Total NET: 950 mL      15 Oct 2022 07:01  -  15 Oct 2022 17:07  --------------------------------------------------------  IN:    Continuous Bladder Irrigation (mL): 7750 mL  Total IN: 7750 mL    OUT:    Continuous Bladder Irrigation (mL): 7100 mL  Total OUT: 7100 mL    Total NET: 650 mL          LABS:                        7.8    7.82  )-----------( 193      ( 15 Oct 2022 10:40 )             25.2     10-15    149<H>  |  117<H>  |  23  ----------------------------<  126<H>  3.8   |  27  |  0.92    Ca    8.5      15 Oct 2022 10:40  Phos  2.8     10-15  Mg     2.0     10-15    RADIOLOGY & ADDITIONAL STUDIES:    A/P  89 year old male with hematuria on lovenox for PE in the setting of Covid PNA, hx of AAA, with CBI     - clamp CBI, possible d/c in AM   - continue lovenox  - rest of care per medicine  - d/w Dr. Acevedo

## 2022-10-15 NOTE — PROGRESS NOTE ADULT - SUBJECTIVE AND OBJECTIVE BOX
Patient is a 89y old  Male who presents with a chief complaint of SEPTIC SHOCK; PNA; COVID-19; HISTORY OF ABDOMINAL AORTIC     (14 Oct 2022 14:45)    HPI:  89 year old man with a PMHx of dyslipidemia, hypertension, BPH and history of a AAA repair with a stent graft in [place]. Patient was supposed to have elective repair of his AAA but deemed not fit to proceed due to recent illness. Of Note, patient was recently hospitalized at [facility] from 9/29-10/3 for covid PNA, segmental and subsegmental emboli involving the left lower lobe and acute nonocclusive thrombus in the left mid femoral vein.     Patient presented to the ED with daughter with complaints of with fever 102.7( at home), non productive cough and weakness. In the ED, Vitals significant for temp 101.1, BP: 91/54, HR: 98. O2 Sat: 86 %. Patient was placed on 6 L NC. On work up, WBC 13, neutrophil 91 %, albumin 2.4, lactate 0.9. ABG: PaO2 68. CXR with opacity on the left. Patient denies chest pain, palpitations, abdominal pain, headache, dizziness, shortness of breath, syncope or worsening edema. Patient got 1.5 L of IV fluids in the ED and was started on norepinephrine drip for BP support. Patient admitted to the ICU for further management.  (07 Oct 2022 00:10)    SUBJECTIVE & OBJECTIVE: Pt seen and examined at bedside.   PHYSICAL EXAM:  T(C): 36.6 (10-15-22 @ 05:10), Max: 37 (10-14-22 @ 17:00)  HR: 74 (10-15-22 @ 05:10) (74 - 99)  BP: 101/68 (10-15-22 @ 05:10) (101/68 - 123/69)  RR: 20 (10-15-22 @ 05:10) (18 - 20)  SpO2: 98% (10-15-22 @ 05:10) (92% - 98%) Daily     Daily I&O's Detail    14 Oct 2022 07:01  -  15 Oct 2022 07:00  --------------------------------------------------------  IN:    Continuous Bladder Irrigation (mL): 86592 mL  Total IN: 86696 mL    OUT:    Continuous Bladder Irrigation (mL): 45013 mL  Total OUT: 73078 mL    Total NET: 950 mL      15 Oct 2022 07:01  -  15 Oct 2022 09:01  --------------------------------------------------------  IN:    Continuous Bladder Irrigation (mL): 1500 mL  Total IN: 1500 mL    OUT:    Continuous Bladder Irrigation (mL): 800 mL  Total OUT: 800 mL    Total NET: 700 mL        GENERAL: thin and chronically ill appearing  HEAD:  Atraumatic, Normocephalic  EYES: EOMI, PERRLA, conjunctiva and sclera clear  ENMT: Moist mucous membranes  NECK: Supple, No JVD  NERVOUS SYSTEM:  Alert, Motor Strength 3/5 B/L upper and lower extremities; DTRs 2+ intact and symmetric  CHEST/LUNG: Clear to auscultation bilaterally; No rales, rhonchi, wheezing, or rubs  HEART: Regular rate and rhythm; No murmurs, rubs, or gallops  ABDOMEN: Soft, Nontender, Nondistended; Bowel sounds present  EXTREMITIES:  2+ Peripheral Pulses, No clubbing, cyanosis, or edema  : hematuria  LABS:                        8.5    6.62  )-----------( 193      ( 14 Oct 2022 07:30 )             26.4   CAPILLARY BLOOD GLUCOSE      ABG - ( 14 Oct 2022 10:55 )  pH, Arterial: 7.45  pH, Blood: x     /  pCO2: 34    /  pO2: 89    / HCO3: 24    / Base Excess: 0.1   /  SaO2: 98.8              RECENT CULTURES:   RADIOLOGY & ADDITIONAL TESTS:

## 2022-10-16 NOTE — PROGRESS NOTE ADULT - SUBJECTIVE AND OBJECTIVE BOX
Patient is a 89y old  Male who presents with a chief complaint of Septic Shock, PNA (15 Oct 2022 17:06)    HPI:  89 year old man with a PMHx of dyslipidemia, hypertension, BPH and history of a AAA repair with a stent graft in [place]. Patient was supposed to have elective repair of his AAA but deemed not fit to proceed due to recent illness. Of Note, patient was recently hospitalized at [facility] from -10/3 for covid PNA, segmental and subsegmental emboli involving the left lower lobe and acute nonocclusive thrombus in the left mid femoral vein.     Patient presented to the ED with daughter with complaints of with fever 102.7( at home), non productive cough and weakness. In the ED, Vitals significant for temp 101.1, BP: 91/54, HR: 98. O2 Sat: 86 %. Patient was placed on 6 L NC. On work up, WBC 13, neutrophil 91 %, albumin 2.4, lactate 0.9. ABG: PaO2 68. CXR with opacity on the left. Patient denies chest pain, palpitations, abdominal pain, headache, dizziness, shortness of breath, syncope or worsening edema. Patient got 1.5 L of IV fluids in the ED and was started on norepinephrine drip for BP support. Patient admitted to the ICU for further management.  (07 Oct 2022 00:10)    SUBJECTIVE & OBJECTIVE: Pt seen and examined at bedside.   PHYSICAL EXAM:  T(C): 36.8 (10-17-22 @ 05:14), Max: 37 (10-16-22 @ 16:46)  HR: 78 (10-17-22 @ 06:06) (78 - 112)  BP: 107/61 (10-17-22 @ 05:14) (105/69 - 110/66)  RR: 18 (10-17-22 @ 05:14) (18 - 22)  SpO2: 96% (10-17-22 @ 06:06) (91% - 98%) Daily     Daily Weight in k.5 (17 Oct 2022 05:14)I&O's Detail    15 Oct 2022 07:01  -  16 Oct 2022 07:00  --------------------------------------------------------  IN:    Continuous Bladder Irrigation (mL): 7750 mL  Total IN: 7750 mL    OUT:    Continuous Bladder Irrigation (mL): 7100 mL    Indwelling Catheter - Urethral (mL): 600 mL  Total OUT: 7700 mL    Total NET: 50 mL      16 Oct 2022 07:01  -  17 Oct 2022 06:40  --------------------------------------------------------  IN:  Total IN: 0 mL    OUT:    Indwelling Catheter - Urethral (mL): 650 mL  Total OUT: 650 mL    Total NET: -650 mL        GENERAL: very thin and ill appearing  HEAD:  Atraumatic, Normocephalic  EYES: EOMI, PERRLA, conjunctiva and sclera clear  ENMT: Moist mucous membranes  NECK: Supple, No JVD  NERVOUS SYSTEM:  Alert & Oriented X3, Motor Strength decreased  CHEST/LUNG: Clear to auscultation bilaterally; No rales, rhonchi, wheezing, or rubs  HEART: Regular rate and rhythm; No murmurs, rubs, or gallops  ABDOMEN: Soft, Nontender, Nondistended; Bowel sounds present  EXTREMITIES:  2+ Peripheral Pulses, No clubbing, cyanosis, or edema, right arm with + swelling   : jonathan hematuria in dia catheter  LABS:                        7.3    8.98  )-----------( 204      ( 16 Oct 2022 05:40 )             23.2   CAPILLARY BLOOD GLUCOSE        RECENT CULTURES:   RADIOLOGY & ADDITIONAL TESTS:

## 2022-10-16 NOTE — PROGRESS NOTE ADULT - ASSESSMENT
89 y/o M with PMHx of dyslipidemia, hypertension, BPH and history of a AAA repair with a stent graft in [place], who presented with Sepsis 2/2 PNA    Problem/Plan-1:  Problem: Septic Shock 2/2 Pneumonia  Sepsis resolved at this time  sputum cx grew ecoli ESBL   MRSA nare negative  on Ertapenem  Midodrine-- taper to off bp is atable  Patient encourgaed to use sodium chloride 3% inhalation to help exporate sputum  10/13/2022 continue with chest pt and inhalers ..  10/16 - continue to taper 02      Problem/Plan-2:  Problem: Anemia  Likely from acute blood loss 2/2 traumatic dia placement  Urology recs appreciated  on CBI managed by urology   transfuse as needed already s/p 2 units on 10/10/2022  10/13/2022 now requiring another 2 units of blood may need to consider holding therapeutic loveneox.   follow hgb and consider d/c AC  10/16 - AC has been d/c and patient still has jonathan hematuria, continuing to trend h/h    Problem/Plan-3:  Problem: Hematuria   - improving with cbi   - f/u renal sono  10/13/2022 renal US as above need urology to comment. may also need to consider holding lovenox .  10/16 - AC held, CBI also held per Urology    Problem/Plan-4:  Problem:  Acute Hypoxic Resp Failure  2/2 PE and PNA  Lovenox for PE  10/13/2022 sounds more rhonchi getting cxr , chest pt , inhalers , lasix and speech swallow eval       Problem/Plan-5:  Problem: right arm hematoma  - s/p ru sonogram  - likely hematoma  - warm compresses, elevation and continue to monitor for s/s of compartment syndrome    #Dispo  continued inpatient mgmt   d/w daughter at bedside she understands risk and benefits especially in regards to holding lovenox or continuing   the same

## 2022-10-17 NOTE — SWALLOW BEDSIDE ASSESSMENT ADULT - H & P REVIEW
"89 year old man with a PMHx of dyslipidemia, hypertension, BPH and history of a AAA repair with a stent graft in [place]. Patient was supposed to have elective repair of his AAA but deemed not fit to proceed due to recent illness. Of Note, patient was recently hospitalized at [facility] from 9/29-10/3 for covid PNA, segmental and subsegmental emboli involving the left lower lobe and acute nonocclusive thrombus in the left mid femoral vein. Patient presented to the ED with daughter with complaints of with fever 102.7( at home), non productive cough and weakness. In the ED, Vitals significant for temp 101.1, BP: 91/54, HR: 98. O2 Sat: 86 %. Patient was placed on 6 L NC. On work up, WBC 13, neutrophil 91 %, albumin 2.4, lactate 0.9. ABG: PaO2 68. CXR with opacity on the left. Patient denies chest pain, palpitations, abdominal pain, headache, dizziness, shortness of breath, syncope or worsening edema. Patient got 1.5 L of IV fluids in the ED and was started on norepinephrine drip for BP support. Patient admitted to the ICU for further management."/yes

## 2022-10-17 NOTE — SWALLOW BEDSIDE ASSESSMENT ADULT - COMMENTS
Xray chest 1 view 10/13/2022 IMPRESSION: Patchy and interstitial opacities bilateral are mostly unchanged. There is new patchy retrocardiac opacity possibly atelectasis. Trace bilateral pleural effusions

## 2022-10-17 NOTE — PROGRESS NOTE ADULT - ASSESSMENT
88 y/o M with PMHx of dyslipidemia, hypertension, BPH and history of a AAA repair with a stent graft in [place], who presented with Sepsis 2/2 PNA    Problem/Plan-1:  Problem: Septic Shock 2/2 Pneumonia  Sepsis resolved at this time  sputum cx grew ecoli ESBL   MRSA nare negative  on Ertapenem  Midodrine-- taper to off bp is atable  Patient encourgaed to use sodium chloride 3% inhalation to help exporate sputum  10/13/2022 continue with chest pt and inhalers ..  10/16 - continue to taper 02 he is still on >6 L o2 (15l?)      Problem/Plan-2:  Problem: Anemia  Likely from acute blood loss 2/2 traumatic dia placement  Urology recs appreciated  on CBI managed by urology   transfuse as needed already s/p 2 units on 10/10/2022  10/13/2022 now requiring another 2 units of blood may need to consider holding therapeutic loveneox.   follow hgb and consider d/c AC  10/16 - AC has been d/c and patient still has jonathan hematuria, continuing to trend h/h  10/17 - the patient is still off anticoagulation.  His hemobglobin is downtrending and he is still having jonathan hematuria.     Problem/Plan-3:  Problem: Hematuria   - improving with cbi   - f/u renal sono  10/13/2022 renal US as above need urology to comment. may also need to consider holding lovenox .  10/16 - AC held, CBI also held per Urology  10/17 - CBI has been restarted, Lovenox is held, IR consulted for IVC but this patient has abdominal aneurysm that is compressing the IVC.  If he were to have IVC placed, it would need to be a semipermanent solution and removed after 1 month.      Problem/Plan-4:  Problem:  Acute Hypoxic Resp Failure  2/2 PE and PNA  Lovenox for PE  10/13/2022 sounds more rhonchi getting cxr , chest pt , inhalers , lasix and speech swallow eval   - still has increased 02 requirements.       Problem/Plan-5:  Problem: right arm hematoma  - s/p ru sonogram  - likely hematoma  - warm compresses, elevation and continue to monitor for s/s of compartment syndrome    #Dispo  continued inpatient mgmt   d/w daughter at bedside she understands risk and benefits especially in regards to holding lovenox or continuing   the same

## 2022-10-17 NOTE — SWALLOW BEDSIDE ASSESSMENT ADULT - SLP GENERAL OBSERVATIONS
Pt seen bedside 10LNC in place, alert and oriented x2. Pt was pleasantly confused, he responded to questions for assessment with fair accuracy, followed one step directions for oral [facility] exam. Pt presented with good speech intelligibility, baseline wet vocal quality and he verbalized his wants and needs. Pt seen bedside 10LNC in place, alert and oriented x2. Pt was pleasantly confused, he responded to questions for assessment with fair accuracy, he verbalized wants and followed one step directions for [facility] exam. Pt presented with good speech intelligibility, and wet vocal quality.

## 2022-10-17 NOTE — PROGRESS NOTE ADULT - SUBJECTIVE AND OBJECTIVE BOX
Patient seen and examined bedside resting comfortably.  No complaints offered.   Indwelling dia with jonathan blood  CBI restarted    T(F): 97.7 (10-17-22 @ 09:53), Max: 98.6 (10-16-22 @ 16:46)  HR: 78 (10-17-22 @ 09:53) (78 - 112)  BP: 96/64 (10-17-22 @ 09:53) (96/64 - 107/61)  RR: 18 (10-17-22 @ 09:53) (18 - 20)  SpO2: 97% (10-17-22 @ 09:53) (92% - 98%)    ROS:  Negative unless otherwise stated    PHYSICAL EXAM:    General: NAD, alert and awake  HEENT: NCAT, EOMI, conjunctiva clear  Chest: nonlabored respirations, CTA b/l.  Abdomen: soft, NT/ND.   Extremities: Calf soft, nontender b/l.   : No suprapubic tenderness or bladder distention.  Indwelling dia with jonathan blood  CBI restarted    LABS:                        7.2    10.72 )-----------( 192      ( 17 Oct 2022 07:35 )             23.9   10-16    146<H>  |  115<H>  |  24<H>  ----------------------------<  100<H>  4.1   |  26  |  0.86    Ca    8.6      16 Oct 2022 05:40  Phos  2.9     10-16  Mg     2.1     10-16      I&O's Detail    16 Oct 2022 07:01  -  17 Oct 2022 07:00  --------------------------------------------------------  IN:  Total IN: 0 mL    OUT:    Indwelling Catheter - Urethral (mL): 650 mL  Total OUT: 650 mL    Total NET: -650 mL

## 2022-10-17 NOTE — SWALLOW BEDSIDE ASSESSMENT ADULT - SWALLOW EVAL: DIAGNOSIS
Oropharyngeal phases of swallow marked by baseline cough, baseline wet vocal quality, delayed oral transit time, delayed pharyngeal swallow, decreased laryngeal elevation, wet vocal quality post oral intake, cough post oral intake, and multiple swallows. Clinical signs of laryngeal penetration or aspiration present. Suggest pt not safe for PO diet at this time, high risk for aspiration, malnutrition and dehydration. Oropharyngeal phases of swallow marked by baseline cough, baseline wet vocal quality, increased oral transit time, ?delayed pharyngeal swallow with decreased laryngeal elevation to palpation. clinical signs of airway penetration wet vocal quality and cough post oral intake, and multiple swallows.  Suggest pt not safe for PO diet at this time, high risk for aspiration, malnutrition and dehydration.

## 2022-10-17 NOTE — PROGRESS NOTE ADULT - SUBJECTIVE AND OBJECTIVE BOX
Patient is a 89y old  Male who presents with a chief complaint of Septic Shock, PNA (17 Oct 2022 11:34)    HPI:  89 year old man with a PMHx of dyslipidemia, hypertension, BPH and history of a AAA repair with a stent graft in [place]. Patient was supposed to have elective repair of his AAA but deemed not fit to proceed due to recent illness. Of Note, patient was recently hospitalized at [facility] from -10/3 for covid PNA, segmental and subsegmental emboli involving the left lower lobe and acute nonocclusive thrombus in the left mid femoral vein.     Patient presented to the ED with daughter with complaints of with fever 102.7( at home), non productive cough and weakness. In the ED, Vitals significant for temp 101.1, BP: 91/54, HR: 98. O2 Sat: 86 %. Patient was placed on 6 L NC. On work up, WBC 13, neutrophil 91 %, albumin 2.4, lactate 0.9. ABG: PaO2 68. CXR with opacity on the left. Patient denies chest pain, palpitations, abdominal pain, headache, dizziness, shortness of breath, syncope or worsening edema. Patient got 1.5 L of IV fluids in the ED and was started on norepinephrine drip for BP support. Patient admitted to the ICU for further management.  (07 Oct 2022 00:10)    SUBJECTIVE & OBJECTIVE: Pt seen and examined at bedside.   PHYSICAL EXAM:  T(C): 36.5 (10-17-22 @ 09:53), Max: 37 (10-16-22 @ 16:46)  HR: 80 (10-17-22 @ 11:34) (78 - 112)  BP: 96/64 (10-17-22 @ 09:53) (96/64 - 107/61)  RR: 18 (10-17-22 @ 09:53) (18 - 20)  SpO2: 97% (10-17-22 @ 11:34) (92% - 98%) Daily     Daily Weight in k.5 (17 Oct 2022 05:14)I&O's Detail    16 Oct 2022 07:01  -  17 Oct 2022 07:00  --------------------------------------------------------  IN:  Total IN: 0 mL    OUT:    Indwelling Catheter - Urethral (mL): 650 mL  Total OUT: 650 mL    Total NET: -650 mL        GENERAL: NAD, well-groomed, well-developed  HEAD:  Atraumatic, Normocephalic  EYES: EOMI, PERRLA, conjunctiva and sclera clear  ENMT: Moist mucous membranes  NECK: Supple, No JVD  NERVOUS SYSTEM:  Alert & Oriented X3, Motor Strength 5/5 B/L upper and lower extremities; DTRs 2+ intact and symmetric  CHEST/LUNG: Clear to auscultation bilaterally; No rales, rhonchi, wheezing, or rubs  HEART: Regular rate and rhythm; No murmurs, rubs, or gallops  ABDOMEN: Soft, Nontender, Nondistended; Bowel sounds present  EXTREMITIES:  2+ Peripheral Pulses, No clubbing, cyanosis, or edema  LABS:                        7.2    10.72 )-----------( 192      ( 17 Oct 2022 07:35 )             23.9   CAPILLARY BLOOD GLUCOSE        RECENT CULTURES:   RADIOLOGY & ADDITIONAL TESTS:

## 2022-10-17 NOTE — PROGRESS NOTE ADULT - ASSESSMENT
A/P  89 year old male with hematuria on lovenox for PE in the setting of Covid PNA, hx of AAA, with CBI     - CBI restarted due to gross hematuria  - continue lovenox  - rest of care per medicine  - d/w Dr. Acevedo

## 2022-10-17 NOTE — CONSULT NOTE ADULT - SUBJECTIVE AND OBJECTIVE BOX
Interventional Radiology Inpatient Consult Note       HPI:  89 year old man with a PMHx of dyslipidemia, hypertension, BPH and history of a AAA repair with a stent graft in [place]. Patient was supposed to have elective repair of his AAA but deemed not fit to proceed due to recent illness. Of Note, patient was recently hospitalized at [facility] from 9/29-10/3 for covid PNA, segmental and subsegmental emboli involving the left lower lobe and acute nonocclusive thrombus in the left mid femoral vein.       Vital Signs: Vital Signs Last 24 Hrs  T(C): 36.5 (17 Oct 2022 09:53), Max: 37 (16 Oct 2022 16:46)  T(F): 97.7 (17 Oct 2022 09:53), Max: 98.6 (16 Oct 2022 16:46)  HR: 78 (17 Oct 2022 09:53) (78 - 112)  BP: 96/64 (17 Oct 2022 09:53) (96/64 - 110/66)  BP(mean): --  RR: 18 (17 Oct 2022 09:53) (18 - 22)  SpO2: 97% (17 Oct 2022 09:53) (91% - 98%)    Parameters below as of 17 Oct 2022 09:53  Patient On (Oxygen Delivery Method): nasal cannula        Past Medical/ Surgical History: PAST MEDICAL & SURGICAL HISTORY:  AAA (abdominal aortic aneurysm)      Acute CHF      Chronic systolic congestive heart failure          Allergies: Allergies    No Known Allergies    Intolerances        Medications: MEDICATIONS  (STANDING):  ALBUTerol    90 MICROgram(s) HFA Inhaler 2 Puff(s) Inhalation every 6 hours  atorvastatin 20 milliGRAM(s) Oral at bedtime  dextrose 5% + sodium chloride 0.45%. 1000 milliLiter(s) (100 mL/Hr) IV Continuous <Continuous>  ertapenem  IVPB 1000 milliGRAM(s) IV Intermittent every 24 hours  midodrine 5 milliGRAM(s) Oral every 8 hours  sodium chloride 3%  Inhalation 4 milliLiter(s) Inhalation every 6 hours  tamsulosin 0.4 milliGRAM(s) Oral at bedtime    MEDICATIONS  (PRN):  guaiFENesin Oral Liquid (Sugar-Free) 200 milliGRAM(s) Oral every 6 hours PRN Cough  morphine  - Injectable 2 milliGRAM(s) IV Push every 6 hours PRN Moderate Pain (4 - 6)  phenazopyridine 100 milliGRAM(s) Oral every 8 hours PRN bladder spasms      SOCIAL HISTORY:    FAMILY HISTORY:  No pertinent family history in first degree relatives          PHYSICAL EXAM:    General:   Well-groomed, well-nourished, in no distress  Lungs:  CTA bilaterally  Cardiovascular:   S1, S2,   Abdomen:  Soft, non-tender, non-distended,   Extremities:  no calf tenderness/swelling bilaterally  Musculoskeletal:  Full ROM in all joints w/o swelling/tenderness/effusion  Neuro/Psych:  A &O x 3    LABS:                        7.2    10.72 )-----------( 192      ( 17 Oct 2022 07:35 )             23.9     10-16    146<H>  |  115<H>  |  24<H>  ----------------------------<  100<H>  4.1   |  26  |  0.86    Ca    8.6      16 Oct 2022 05:40  Phos  2.9     10-16  Mg     2.1     10-16            RADIOLOGY & ADDITIONAL STUDIES:  CT ANGIO ABD PELV (W)AW IC - ORDERED BY: AIDA RESENDIZ      PROCEDURE DATE: 09/13/2022        INTERPRETATION: CLINICAL INFORMATION: 89-year-old man status post endovascular repair abdominal aorta with history of endoleak    COMPARISON: Ultrasound 07/29/2022    CONTRAST/COMPLICATIONS:  IV Contrast: Omnipaque 350 90 cc administered 10 cc discarded  Oral Contrast: NONE  Complications: None reported at time of study completion    PROCEDURE:  CT Angiography of the Abdomen and Pelvis.  Precontrast, Arterial and Delayed phases were acquired.  Sagittal and coronal reformats were performed as well as 3D (MIP) reconstructions.    FINDINGS:  LOWER CHEST: Within normal limits.    LIVER: Within normal limits.  BILE DUCTS: Normal caliber.  GALLBLADDER: Within normal limits.  SPLEEN: Within normal limits.  PANCREAS: Within normal limits.  ADRENALS: Within normal limits.  KIDNEYS/URETERS: Dilated left extrarenal pelvis which may be related to the large residual aneurysm sac.    BLADDER: Within normal limits.  REPRODUCTIVE ORGANS: Enlarged prostate    BOWEL: No bowel obstruction. Appendix not visualized.  PERITONEUM: No ascites.  VESSELS: An endograft is identified extending from just superior to the aortic bifurcation with bilateral iliac limbs.. Residual thrombosed aneurysm sac is identified measuring 13.3 cm similar to the prior ultrasound. Several small foci of contrast are visualized in the residual aneurysm sac at the level of the proximal common iliac limbs. (4:62). The delayed image also shows a large amount of extravasated contrast (5:32). Evaluation at a higher level is somewhat limited due to artifact from multiple presumed coils.  Penetrating ulcers are identified just inferior to the origin of the SMA (4:24)  The celiac axis and branches, SMA, bilateral renal arteries, are patent. The the TARYN may fill from collateral vessels. The common iliac, external iliac and internal iliac vessels are also patent with patent visualized proximal femoral vessels.  RETROPERITONEUM/LYMPH NODES: No lymphadenopathy.  ABDOMINAL WALL: Left scrotal hernia containing a portion of the colon.  BONES: Levoscoliosis thoracolumbar spine. Degenerative change.    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  Penetrating ulcers proximal abdominal aorta just inferior to the origin of the SMA.      A/P: 89 year old man with a PMHx of dyslipidemia, hypertension, BPH and history of a AAA repair with a stent graft in [place]. Patient was supposed to have elective repair of his AAA but deemed not fit to proceed due to recent illness. Of Note, patient was recently hospitalized at [facility] from 9/29-10/3 for covid PNA, segmental and subsegmental emboli involving the left lower lobe and acute nonocclusive thrombus in the left mid femoral vein.    Patient now s/p traumatic dia placement with gross hematuria requiring transfusions and cessation of anticoagulation. Pt with an enlarging aortic aneurysm sac as per history with evidence of endoleak on imaging here. The imaging also shows mass effect on the IVC by the aneurysm sac which increases risk of potential complication with filter placement. If there is continued concern for clot embolization, a filter may be placed with the caveat that the filter should be in for no more than a few months to minimize risk of leg penetration into the AAA sac. Efforts to control prostate bleeding would be favorable.      Gurdeep Yanez MD  Interventional Radiology

## 2022-10-18 NOTE — PROGRESS NOTE ADULT - ASSESSMENT
A/P  89 year old male with hematuria on lovenox for PE in the setting of Covid PNA, hx of AAA, with CBI     - CBI held again  - Will hold lovenox temporarily  - rest of care per medicine  - d/w Dr. Acevedo

## 2022-10-18 NOTE — CONSULT NOTE ADULT - SUBJECTIVE AND OBJECTIVE BOX
Patient is a 89y old  Male who presents with a chief complaint of Septic Shock, PNA (18 Oct 2022 15:58)      HPI:  89 year old man with a PMHx of dyslipidemia, hypertension, BPH and history of a AAA repair with a stent graft in [place]. Patient was supposed to have elective repair of his AAA but deemed not fit to proceed due to recent illness. Of Note, patient was recently hospitalized at [facility] from -10/3 for covid PNA, segmental and subsegmental emboli involving the left lower lobe and acute nonocclusive thrombus in the left mid femoral vein.     Patient presented to the ED with daughter with complaints of with fever 102.7( at home), non productive cough and weakness. In the ED, Vitals significant for temp 101.1, BP: 91/54, HR: 98. O2 Sat: 86 %. Patient was placed on 6 L NC. On work up, WBC 13, neutrophil 91 %, albumin 2.4, lactate 0.9. ABG: PaO2 68. CXR with opacity on the left. Patient denies chest pain, palpitations, abdominal pain, headache, dizziness, shortness of breath, syncope or worsening edema. Patient got 1.5 L of IV fluids in the ED and was started on norepinephrine drip for BP support. Patient admitted to the ICU for further management.  (07 Oct 2022 00:10)      Allergies  No Known Allergies        MEDICATIONS  (STANDING):  ALBUTerol    0.083% 2.5 milliGRAM(s) Nebulizer every 6 hours  atorvastatin 20 milliGRAM(s) Oral at bedtime  chlorhexidine 0.12% Liquid 15 milliLiter(s) Oral Mucosa every 12 hours  chlorhexidine 2% Cloths 1 Application(s) Topical <User Schedule>  chlorhexidine 4% Liquid 1 Application(s) Topical <User Schedule>  dexAMETHasone  Injectable 6 milliGRAM(s) IV Push daily  meropenem  IVPB 1000 milliGRAM(s) IV Intermittent every 8 hours  midodrine 5 milliGRAM(s) Oral every 8 hours  norepinephrine Infusion 0.05 MICROgram(s)/kG/Min (5.72 mL/Hr) IV Continuous <Continuous>  propofol Infusion 10 MICROgram(s)/kG/Min (3.66 mL/Hr) IV Continuous <Continuous>  sodium chloride 3%  Inhalation 4 milliLiter(s) Inhalation every 6 hours  tamsulosin 0.4 milliGRAM(s) Oral at bedtime    MEDICATIONS  (PRN):  sodium chloride 0.9% lock flush 10 milliLiter(s) IV Push every 1 hour PRN Pre/post blood products, medications, blood draw, and to maintain line patency      Daily     Daily Weight in k.5 (18 Oct 2022 05:49)    Drug Dosing Weight  Height (cm): 177.8 (07 Oct 2022 01:56)  Weight (kg): 69.2 (18 Oct 2022 22:51)  BMI (kg/m2): 21.9 (18 Oct 2022 22:51)  BSA (m2): 1.86 (18 Oct 2022 22:51)    PAST MEDICAL & SURGICAL HISTORY:  AAA (abdominal aortic aneurysm)      Acute CHF      Chronic systolic congestive heart failure          FAMILY HISTORY:  No pertinent family history in first degree relatives        SOCIAL HISTORY:    ADVANCE DIRECTIVES:    REVIEW OF SYSTEMS:    CONSTITUTIONAL: No fever, weight loss, or fatigue  EYES: No eye pain, visual disturbances, or discharge  ENMT:  No difficulty hearing, tinnitus, vertigo; No sinus or throat pain  NECK: No pain or stiffness  BREASTS: No pain, masses, or nipple discharge  RESPIRATORY: No cough, wheezing, chills or hemoptysis; No shortness of breath  CARDIOVASCULAR: No chest pain, palpitations, dizziness, or leg swelling  GASTROINTESTINAL: No abdominal or epigastric pain. No nausea, vomiting, or hematemesis; No diarrhea or constipation. No melena or hematochezia.  GENITOURINARY: No dysuria, frequency, hematuria, or incontinence  NEUROLOGICAL: No headaches, memory loss, loss of strength, numbness, or tremors  SKIN: No itching, burning, rashes, or lesions   LYMPH NODES: No enlarged glands  ENDOCRINE: No heat or cold intolerance; No hair loss  MUSCULOSKELETAL: No joint pain or swelling; No muscle, back, or extremity pain  PSYCHIATRIC: No depression, anxiety, mood swings, or difficulty sleeping  HEME/LYMPH: No easy bruising, or bleeding gums  ALLERGY AND IMMUNOLOGIC: No hives or eczema      Mode: AC/ CMV (Assist Control/ Continuous Mandatory Ventilation)  RR (machine): 18  TV (machine): 450  FiO2: 100  PEEP: 10  ITime: 1  MAP: 15  PIP: 35      ICU Vital Signs Last 24 Hrs  T(C): 37.1 (18 Oct 2022 22:51), Max: 37.1 (18 Oct 2022 22:51)  T(F): 98.7 (18 Oct 2022 22:51), Max: 98.7 (18 Oct 2022 22:51)  HR: 116 (19 Oct 2022 00:00) (78 - 124)  BP: 103/46 (18 Oct 2022 22:05) (55/40 - 127/75)  BP(mean): 61 (18 Oct 2022 22:05) (43 - 87)  ABP: 95/56 (19 Oct 2022 00:00) (95/56 - 135/69)  ABP(mean): 68 (19 Oct 2022 00:00) (68 - 86)  RR: 28 (19 Oct 2022 00:00) (16 - 34)  SpO2: 97% (18 Oct 2022 23:00) (70% - 99%)    O2 Parameters below as of 18 Oct 2022 20:30  Patient On (Oxygen Delivery Method): mask, nonrebreather  O2 Flow (L/min): 15          ABG - ( 18 Oct 2022 23:29 )  pH, Arterial: 7.11  pH, Blood: x     /  pCO2: x     /  pO2: x     / HCO3: x     / Base Excess: x     /  SaO2: x                   I&O's Detail    17 Oct 2022 07:01  -  18 Oct 2022 07:00  --------------------------------------------------------  IN:    Continuous Bladder Irrigation (mL): 7250 mL  Total IN: 7250 mL    OUT:    Continuous Bladder Irrigation (mL): 8620 mL  Total OUT: 8620 mL    Total NET: -1370 mL      18 Oct 2022 07:01  -  19 Oct 2022 00:13  --------------------------------------------------------  IN:    Continuous Bladder Irrigation (mL): 3550 mL    Norepinephrine: 235.9 mL    Oral Fluid: 180 mL    Propofol: 14.5 mL  Total IN: 3980.4 mL    OUT:    Continuous Bladder Irrigation (mL): 4700 mL    Indwelling Catheter - Urethral (mL): 600 mL    Nasogastric/Oral tube (mL): 1900 mL  Total OUT: 7200 mL    Total NET: -3219.6 mL          PHYSICAL EXAM:    GENERAL: NAD, well-groomed, well-developed  HEAD:  Atraumatic, Normocephalic  EYES: EOMI, PERRLA, conjunctiva and sclera clear  ENMT: No tonsillar erythema, exudates, or enlargement; Moist mucous membranes, Good dentition, No lesions  NECK: Supple, No JVD, Normal thyroid  NERVOUS SYSTEM:  Alert & Oriented X3, Good concentration; Motor Strength 5/5 B/L upper and lower extremities; DTRs 2+ intact and symmetric  CHEST/LUNG: Clear to percussion bilaterally; No rales, rhonchi, wheezing, or rubs  HEART: Regular rate and rhythm; No murmurs, rubs, or gallops  ABDOMEN: Soft, Nontender, Nondistended; Bowel sounds present  EXTREMITIES:  2+ Peripheral Pulses, No clubbing, cyanosis, or edema  LYMPH: No lymphadenopathy noted  SKIN: No rashes or lesions    LABS:  CBC Full  -  ( 18 Oct 2022 23:25 )  WBC Count : 17.33 K/uL  RBC Count : 2.96 M/uL  Hemoglobin : 8.2 g/dL  Hematocrit : 27.2 %  Platelet Count - Automated : x  Mean Cell Volume : 91.9 fl  Mean Cell Hemoglobin : 27.7 pg  Mean Cell Hemoglobin Concentration : 30.1 g/dL  Auto Neutrophil # : x  Auto Lymphocyte # : x  Auto Monocyte # : x  Auto Eosinophil # : x  Auto Basophil # : x  Auto Neutrophil % : x  Auto Lymphocyte % : x  Auto Monocyte % : x  Auto Eosinophil % : x  Auto Basophil % : x    10-18    152<H>  |  117<H>  |  34<H>  ----------------------------<  136<H>  4.3   |  29  |  1.19    Ca    9.0      18 Oct 2022 23:20  Phos  5.8     10-18  Mg     2.6     10-18    TPro  5.7<L>  /  Alb  1.6<L>  /  TBili  0.6  /  DBili  x   /  AST  30  /  ALT  35  /  AlkPhos  98  10-18    CAPILLARY BLOOD GLUCOSE      POCT Blood Glucose.: 125 mg/dL (18 Oct 2022 20:37)    PT/INR - ( 18 Oct 2022 23:20 )   PT: 13.5 sec;   INR: 1.13 ratio         PTT - ( 18 Oct 2022 23:20 )  PTT:33.3 sec            EKG:    ECHO, US:    RADIOLOGY:    CRITICAL CARE TIME SPENT:   Patient is a 89y old  Male who presents with a chief complaint of Septic Shock, PNA (18 Oct 2022 15:58)      HPI:  89M PMH AAA with endograft  with post procedure expansion of his sac after initial regression and was treated for type II endoleak with coil embolization but sac has increased in size to > 12 cm aortic sac (pending surgical repair). HTN, HLD, chronic systolic heart failure, BPH with recent admission -10/3 for Covid PNA, segmental and subsegmental emboli involving the left lower lobe and acute nonocclusive thrombus in the left mid femoral vein was not hypoxic and was deferred treatment for covid (no remdesivir/no dexamethasone) started on anticoagulation with eliquis and discharged home presents 10/7 for fever 102.7, weakness and non productive cough. Placed on 6L NC O2 supplementation and found to be hypotensive with L infiltrate on CXR. Started on levophed for BP support. Admitted to ICU initially for septic shock due to ESBL e-coli PNA treated with meropenem-> ertapenem. Downgraded to medical floor 10/7, only required brief vasopressor support. Course with hematuria and acute blood loss anemia s/p total of 4 units pRBC ( 2 units 10/10, and 2 units 10/13), placed on CBI.   RRT called tonight for hypoxia O2 sat 70% on green nasal cannula 10L. FIO2 increased to 15L + NRB with O2 sat only in the low 80s. Pt in respiratory distress, tachypneic with accessory muscle use and agonal breathing along with lethargy. Placed on high flow O2 100% and 50L/min + NRB with o2 sat 88%. Pt with noted JVD, congested respiration. Lasix 40mg IVP given at RRT. GOC addressed with daughter over the phone by medicine team and family unable to make decision. Pt transferred to ICU and intubated.     Hypotensive rony-intubation with SBP 90s. Started on levophed. Increasing pressor requirements due to hypotension. Emergent central line placed under ultrasound guidance and emergent femoral arterial line inserted for more accurate hemodynamic monitoring in critically ill patient.     NGT inserted with noted immediate return of 1900 brown gastric contents drained.       Allergies  No Known Allergies        MEDICATIONS  (STANDING):  ALBUTerol    0.083% 2.5 milliGRAM(s) Nebulizer every 6 hours  atorvastatin 20 milliGRAM(s) Oral at bedtime  chlorhexidine 0.12% Liquid 15 milliLiter(s) Oral Mucosa every 12 hours  chlorhexidine 2% Cloths 1 Application(s) Topical <User Schedule>  dexAMETHasone  Injectable 6 milliGRAM(s) IV Push daily  meropenem  IVPB 1000 milliGRAM(s) IV Intermittent every 8 hours  midodrine 5 milliGRAM(s) Oral every 8 hours  norepinephrine Infusion 0.05 MICROgram(s)/kG/Min (5.72 mL/Hr) IV Continuous <Continuous>  propofol Infusion 10 MICROgram(s)/kG/Min (3.66 mL/Hr) IV Continuous <Continuous>  sodium chloride 3%  Inhalation 4 milliLiter(s) Inhalation every 6 hours  tamsulosin 0.4 milliGRAM(s) Oral at bedtime    MEDICATIONS  (PRN):  sodium chloride 0.9% lock flush 10 milliLiter(s) IV Push every 1 hour PRN Pre/post blood products, medications, blood draw, and to maintain line patency      Daily     Daily Weight in k.5 (18 Oct 2022 05:49)    Drug Dosing Weight  Height (cm): 177.8 (07 Oct 2022 01:56)  Weight (kg): 69.2 (18 Oct 2022 22:51)  BMI (kg/m2): 21.9 (18 Oct 2022 22:51)  BSA (m2): 1.86 (18 Oct 2022 22:51)    PAST MEDICAL & SURGICAL HISTORY:  AAA (abdominal aortic aneurysm)      Acute CHF      Chronic systolic congestive heart failure          FAMILY HISTORY:  No pertinent family history in first degree relatives        SOCIAL HISTORY:  unable to obtain due to lethargy/respiratory distress    ADVANCE DIRECTIVES:  full code - family unable to make any decisions at present time regarding C    REVIEW OF SYSTEMS:  [x] unable to obtain due to respiratory distress and lethargy    Mode: AC/ CMV (Assist Control/ Continuous Mandatory Ventilation)  RR (machine): 18  TV (machine): 450  FiO2: 100  PEEP: 10  ITime: 1  MAP: 15  PIP: 35      ICU Vital Signs Last 24 Hrs  T(C): 37.1 (18 Oct 2022 22:51), Max: 37.1 (18 Oct 2022 22:51)  T(F): 98.7 (18 Oct 2022 22:51), Max: 98.7 (18 Oct 2022 22:51)  HR: 116 (19 Oct 2022 00:00) (78 - 124)  BP: 103/46 (18 Oct 2022 22:05) (55/40 - 127/75)  BP(mean): 61 (18 Oct 2022 22:05) (43 - 87)  ABP: 95/56 (19 Oct 2022 00:00) (95/56 - 135/69)  ABP(mean): 68 (19 Oct 2022 00:00) (68 - 86)  RR: 28 (19 Oct 2022 00:00) (16 - 34)  SpO2: 97% (18 Oct 2022 23:00) (70% - 99%)    O2 Parameters below as of 18 Oct 2022 20:30  Patient On (Oxygen Delivery Method): mask, nonrebreather  O2 Flow (L/min): 15          ABG - ( 18 Oct 2022 23:29 )  pH, Arterial: 7.11     /  pCO2:  94     /  pO2: 93     / HCO3: 30     /   SaO2: 97%           ABG - ( 18 Oct 2022 20:53 )  pH, Arterial: 7.42     /  pCO2:  46     /  pO2: 56     / HCO3: 30     /   SaO2: 89%                 I&O's Detail    17 Oct 2022 07:01  -  18 Oct 2022 07:00  --------------------------------------------------------  IN:    Continuous Bladder Irrigation (mL): 7250 mL  Total IN: 7250 mL    OUT:    Continuous Bladder Irrigation (mL): 8620 mL  Total OUT: 8620 mL    Total NET: -1370 mL      18 Oct 2022 07:01  -  19 Oct 2022 00:13  --------------------------------------------------------  IN:    Continuous Bladder Irrigation (mL): 3550 mL    Norepinephrine: 235.9 mL    Oral Fluid: 180 mL    Propofol: 14.5 mL  Total IN: 3980.4 mL    OUT:    Continuous Bladder Irrigation (mL): 4700 mL    Indwelling Catheter - Urethral (mL): 600 mL    Nasogastric/Oral tube (mL): 1900 mL  Total OUT: 7200 mL    Total NET: -3219.6 mL          PHYSICAL EXAM:  GENERAL: elderly male, chronically ill appearing, in respiratory distress, agonal breathing pattern, lethargic  HEAD:  Atraumatic, Normocephalic  EYES: conjunctiva and sclera clear  ENMT: poor dentition  NECK: Supple, + bilateral JVD  NERVOUS SYSTEM:  lethargic but arousable, answering some simple questions but dozes off  CHEST/LUNG: bilateral coarse breath sounds and rhonchi, no wheeze, bilateral air entry  HEART: tachycardic  ABDOMEN: Soft, Nontender, Nondistended; Bowel sounds present  EXTREMITIES:  bilateral LE pitting edema 2+ and RUE 2-3 + pitting/weeping edema, fingers and toes cool to touch  : dia in place, CBI held    LABS:  CBC Full  -  ( 18 Oct 2022 23:25 )  WBC Count : 17.33 K/uL  RBC Count : 2.96 M/uL  Hemoglobin : 8.2 g/dL  Hematocrit : 27.2 %  Platelet Count - Automated : x  Mean Cell Volume : 91.9 fl  Mean Cell Hemoglobin : 27.7 pg  Mean Cell Hemoglobin Concentration : 30.1 g/dL  Auto Neutrophil # : x  Auto Lymphocyte # : x  Auto Monocyte # : x  Auto Eosinophil # : x  Auto Basophil # : x  Auto Neutrophil % : x  Auto Lymphocyte % : x  Auto Monocyte % : x  Auto Eosinophil % : x  Auto Basophil % : x    10-18    152<H>  |  117<H>  |  34<H>  ---------------------------------------<  136<H>  4.3          |  29           |  1.19    Ca    9.0      18 Oct 2022 23:20  Phos  5.8     10-18  Mg     2.6     10-18    TPro  5.7<L>  /  Alb  1.6<L>  /  TBili  0.6  /  DBili  x   /  AST  30  /  ALT  35  /  AlkPhos  98  10-18    CAPILLARY BLOOD GLUCOSE  POCT Blood Glucose.: 125 mg/dL (18 Oct 2022 20:37)      PT/INR - ( 18 Oct 2022 23:20 )   PT: 13.5 sec;   INR: 1.13 ratio    PTT - ( 18 Oct 2022 23:20 )  PTT:33.3 sec    Lactate, Blood (10.18.22 @ 23:20)    Lactate, Blood: 2.0 mmol/L    Culture - Sputum . (10.07.22 @ 04:30)    Specimen Source: .Sputum Sputum    Culture Results:  Moderate Escherichia coli ESBL        ECHO < from: TTE Echo Complete w/o Contrast w/ Doppler (10.02.22 @ 13:38) >  1. Left ventricular ejection fraction, by visual estimation, is 60 to 65%.   2. Technically adequate study.   3. Normal global left ventricular systolic function.   4. Normal left ventricular internal cavity size.   5. Spectral Doppler shows impaired relaxation pattern of left ventricular myocardial filling (Grade I diastolic dysfunction).   6. Normal right ventricular size and function.   7. Normal left atrial size.   8. Normal right atrial size.   9. There is no evidence of pericardial effusion.  10. Mild mitral valve regurgitation.  11. Mild thickening and calcification of the anterior and posterior mitral valve leaflets.  12. Mild tricuspid regurgitation.  13. Moderate aortic regurgitation.  14. Normal trileaflet aortic valve with normal opening.  15. Sclerotic aortic valve with normal opening.  16. Mild pulmonic valve regurgitation.  17. Increased relative wall thickness with normal mass index consistent with left ventricular concentric remodeling.  18. Multi-lobulated liver cyst seen. Consider dedicated abdominal study to further assess.        RADIOLOGY:  CXR < from: Xray Chest 1 View- PORTABLE-Urgent (10.18.22 @ 11:05) >  Heart size stable. Tortuous aorta. Scoliosis and degenerative changes.  here is increasing patchy airspace infiltrate left lower lobe and   retrocardiac. Patchy airspace infiltrates again seen throughout the right   lung. Again seen is right greater than left apical pleural thickening.   Clips are seen in the right hilum. The bony structures are intact.

## 2022-10-18 NOTE — PROCEDURE NOTE - NSPROCDETAILS_GEN_ALL_CORE
guidewire recovered/lumen(s) aspirated and flushed/sterile dressing applied/sterile technique, catheter placed/ultrasound guidance with use of sterile gel and probe cove
location identified, draped/prepped, sterile technique used, needle inserted/introduced/positive blood return obtained via catheter/connected to a pressurized flush line/sutured in place/Seldinger technique/all materials/supplies accounted for at end of procedure
patient pre-oxygenated, tube inserted, placement confirmed
1900cc brown gastric output drained from stomach/nasogastric/audible air bolus/placement confirmed by auscultation/gastric secretions aspirated, placement confirmed/bowel sounds present to 4 quadrants/connected to suction

## 2022-10-18 NOTE — CONSULT NOTE ADULT - ASSESSMENT
89M PMH AAA with endograft 2014 with post procedure expansion of his sac after initial regression and was treated for type II endoleak with coil embolization but sac has increased in size to > 12 cm aortic sac (pending surgical repair). HTN, HLD, chronic systolic heart failure, BPH with recent admission 9/29-10/3 for COVID PNA, LLL segmental and subsegmental emboli with acute L femoral nonocclusive DVT (not hypoxic on that admission and was deferred treatment for covid no remdesivir/no dexamethasone) started on anticoagulation with eliquis and discharged home presents 10/7 for fever 102.7, weakness and non productive cough. Placed on 6L NC O2 supplementation and found to be hypotensive with L infiltrate on CXR. Started on levophed for BP support. Admitted to ICU initially for septic shock due to ESBL e-coli PNA treated with meropenem. Downgraded to medical floor 10/7, only required brief vasopressor support. Course with hematuria and acute blood loss anemia s/p total of 4 units pRBC ( 2 units 10/10, and 2 units 10/13), placed on CBI. Now with acute hypoxic respiratory failure requiring intubation upon transfer to ICU.    DX: acute hypoxic and hypercarbic respiratory failure requiring intubation, ARDS, worsening multifocal PNA (possible aspiration PNA), severe sepsis with septic shock, acute metabolic encephalopathy    NEURO  sedated with propofol while intubated  sedation vacation as tolerates  metabolic encephalopathy likely in setting of hypoxia/hypercarbia, sepsis    CV  hypotensive now on vasopressor support: levophed  maintain MAP > 65  anticoagulation with full dose lovenox on hold due to recent hematuria and acute blood loss anemia requiring pRBC transfusion   s/p lasix diuresis at RRT for component of pulmonary congestion and pitting edema of extremities     PULM  suspect worsening PNA may be due to aspiration as 1.9L gastric contents evacuated from stomach with NGT insertion  was just recently on ertapenem  check MRSA swab  sputum cx  resume carbapenem therapy with meropenem  RR and TV increased due to severe hypercarbia/respiratory acidosis  repeat and trend ABG  CXR with worsening bilateral infiltrates  going into ARDS: low TV and high PEEP setting as tolerates  increase sedation to coordinate with mechanical ventilation  pt with recent COVID and was not treated as he was asymptomatic, will give dexamethasone however acute respiratory distress may be more due to bacterial PNA/aspiration    ID  check sputum cx, blood cx  resume antibx: meropenem  check MRSA swab if positive start vanco  source of sepsis likely pulmonary    GI  high NGT output with 1900 cc brown liquid gastric contents evacuated  NPO  NGT to low intermittent suction      dia catheter in place  CBI held   follow up for need for CBI, no hematuria at present time    HEME/VASC  DVT/PE: AC held due to recent hematuria and acute blood loss anemia  monitor H/H    GEN  full code at present time  daughter Krystal updated by ICU team and notified of tenuous status and critical condition       89M PMH AAA with endograft 2014 with post procedure expansion of his sac after initial regression and was treated for type II endoleak with coil embolization but sac has increased in size to > 12 cm aortic sac (pending surgical repair). HTN, HLD, chronic systolic heart failure, BPH with recent admission 9/29-10/3 for COVID PNA, LLL segmental and subsegmental emboli with acute L femoral nonocclusive DVT (not hypoxic on that admission and was deferred treatment for covid no remdesivir/no dexamethasone) started on anticoagulation with eliquis and discharged home presents 10/7 for fever 102.7, weakness and non productive cough. Placed on 6L NC O2 supplementation and found to be hypotensive with L infiltrate on CXR. Started on levophed for BP support. Admitted to ICU initially for septic shock due to ESBL e-coli PNA treated with meropenem. Downgraded to medical floor 10/7, only required brief vasopressor support. Course with hematuria and acute blood loss anemia s/p total of 4 units pRBC ( 2 units 10/10, and 2 units 10/13), placed on CBI. Now with acute hypoxic respiratory failure requiring intubation upon transfer to ICU.    DX: acute hypoxic and hypercarbic respiratory failure requiring intubation, ARDS, worsening multifocal PNA (possible aspiration PNA), severe sepsis with septic shock, acute metabolic encephalopathy    NEURO  sedated with propofol while intubated  sedation vacation as tolerates  metabolic encephalopathy likely in setting of hypoxia/hypercarbia, sepsis    CV  hypotensive now on vasopressor support: levophed  maintain MAP > 65  anticoagulation with full dose lovenox on hold due to recent hematuria and acute blood loss anemia requiring pRBC transfusion   s/p lasix diuresis at RRT for component of pulmonary congestion and pitting edema of extremities     PULM  suspect worsening PNA may be due to aspiration as 1.9L gastric contents evacuated from stomach with NGT insertion  was just recently on ertapenem  check MRSA swab  sputum cx  resume carbapenem therapy with meropenem  RR and TV increased due to severe hypercarbia/respiratory acidosis  repeat and trend ABG  CXR with worsening bilateral infiltrates  going into ARDS: low TV and high PEEP setting as tolerates  increase sedation to coordinate with mechanical ventilation  pt with recent COVID and was not treated as he was asymptomatic, will give dexamethasone with acute respiratory failure for possible worsening COVID lung or it may be more due to bacterial PNA/aspiration    ID  check sputum cx, blood cx  resume antibx: meropenem  check MRSA swab if positive start vanco  source of sepsis likely pulmonary    GI  high NGT output with 1900 cc brown liquid gastric contents evacuated  NPO  NGT to low intermittent suction      dia catheter in place  CBI held   follow up for need for CBI, no hematuria at present time    HEME/VASC  DVT/PE: AC held due to recent hematuria and acute blood loss anemia  monitor H/H    GEN  full code at present time  daughter Krystal updated by ICU team and notified of tenuous status and critical condition

## 2022-10-18 NOTE — PROCEDURE NOTE - NSNEEDLEGAUGE_GEN_A_CORE
Continue to MONITOR CLOSELY to determine the need for TREATMENT and INCREASE/DECREASE in length of time till next follow up visit. 20

## 2022-10-18 NOTE — PROGRESS NOTE ADULT - SUBJECTIVE AND OBJECTIVE BOX
Patient is a 89y old  Male who presents with a chief complaint of Septic Shock, PNA (18 Oct 2022 13:06)    HPI:  89 year old man with a PMHx of dyslipidemia, hypertension, BPH and history of a AAA repair with a stent graft in [place]. Patient was supposed to have elective repair of his AAA but deemed not fit to proceed due to recent illness. Of Note, patient was recently hospitalized at [facility] from -10/3 for covid PNA, segmental and subsegmental emboli involving the left lower lobe and acute nonocclusive thrombus in the left mid femoral vein.     Patient presented to the ED with daughter with complaints of with fever 102.7( at home), non productive cough and weakness. In the ED, Vitals significant for temp 101.1, BP: 91/54, HR: 98. O2 Sat: 86 %. Patient was placed on 6 L NC. On work up, WBC 13, neutrophil 91 %, albumin 2.4, lactate 0.9. ABG: PaO2 68. CXR with opacity on the left. Patient denies chest pain, palpitations, abdominal pain, headache, dizziness, shortness of breath, syncope or worsening edema. Patient got 1.5 L of IV fluids in the ED and was started on norepinephrine drip for BP support. Patient admitted to the ICU for further management.  (07 Oct 2022 00:10)    SUBJECTIVE & OBJECTIVE: Pt seen and examined at bedside.   PHYSICAL EXAM:  T(C): 36.4 (10-18-22 @ 11:15), Max: 36.9 (10-18-22 @ 05:49)  HR: 80 (10-18-22 @ 11:47) (75 - 106)  BP: 107/73 (10-18-22 @ 11:15) (104/70 - 111/67)  RR: 18 (10-18-22 @ 11:15) (18 - 20)  SpO2: 98% (10-18-22 @ 11:47) (94% - 98%) Daily     Daily Weight in k.5 (18 Oct 2022 05:49)I&O's Detail    17 Oct 2022 07:01  -  18 Oct 2022 07:00  --------------------------------------------------------  IN:    Continuous Bladder Irrigation (mL): 7250 mL  Total IN: 7250 mL    OUT:    Continuous Bladder Irrigation (mL): 8620 mL  Total OUT: 8620 mL    Total NET: -1370 mL        GENERAL: acutely ill appearing   HEAD:  Atraumatic, Normocephalic  EYES: EOMI, PERRLA, conjunctiva and sclera clear  ENMT: Moist mucous membranes  NECK: Supple, No JVD  NERVOUS SYSTEM:  Alert & weak appearing,   CHEST/LUNG: Coarse breath sounds wet cough  HEART: Regular rate and rhythm; No murmurs, rubs, or gallops  ABDOMEN: Soft, Nontender, Nondistended; Bowel sounds present  EXTREMITIES:  2+ Peripheral Pulses, No clubbing, cyanosis, or edema  LABS:                        7.4    10.11 )-----------( 224      ( 18 Oct 2022 08:09 )             24.7   CAPILLARY BLOOD GLUCOSE      ABG - ( 18 Oct 2022 11:02 )  pH, Arterial: 7.47  pH, Blood: x     /  pCO2: 38    /  pO2: 71    / HCO3: 28    / Base Excess: 3.9   /  SaO2: 96.6              RECENT CULTURES:   RADIOLOGY & ADDITIONAL TESTS: < from: Xray Chest 1 View- PORTABLE-Urgent (Xray Chest 1 View- PORTABLE-Urgent .) (10.13.22 @ 23:26) >  Patchy and interstitial opacities bilateral are mostly unchanged. There   is new patchy retrocardiac opacity possibly atelectasis. Trace bilateral   pleural effusions    < end of copied text >

## 2022-10-18 NOTE — PROGRESS NOTE ADULT - ASSESSMENT
89 y/o M with PMHx of dyslipidemia, hypertension, BPH and history of a AAA repair with a stent graft in [place], who presented with Sepsis 2/2 PNA    Problem/Plan-1:  Problem: Septic Shock 2/2 Pneumonia  Sepsis resolved at this time  sputum cx grew ecoli ESBL   MRSA nare negative  on Ertapenem  Midodrine-- taper to off bp is atable  Patient encourgaed to use sodium chloride 3% inhalation to help exporate sputum  10/13/2022 continue with chest pt and inhalers ..  10/16 - continue to taper 02 he is still on >6 L o2 (15l?)  10/18 - patient has been tapered to 10L 02    Problem/Plan-2:  Problem: Anemia  Likely from acute blood loss 2/2 traumatic dia placement  Urology recs appreciated  on CBI managed by urology   transfuse as needed already s/p 2 units on 10/10/2022  10/13/2022 now requiring another 2 units of blood may need to consider holding therapeutic loveneox.   follow hgb and consider d/c AC  10/16 - AC has been d/c and patient still has jonathan hematuria, continuing to trend h/h  10/17 - the patient is still off anticoagulation.  His hemobglobin is downtrending and he is still having jonathan hematuria.   10/18 - CBI and dia are yielding clear and straw-colored urine,     Problem/Plan-3:  Problem: Hematuria   - improving with cbi   - f/u renal sono  10/13/2022 renal US as above need urology to comment. may also need to consider holding lovenox .  10/16 - AC held, CBI also held per Urology  10/18 - hgb stable at 7.4 - transfuse if less than 8.0  10/17 - CBI has been restarted, Lovenox is held, IR consulted for IVC but this patient has abdominal aneurysm that is compressing the IVC.  If he were to have IVC placed, it would need to be a semipermanent solution and removed after 1 month.    10/18 - CBI stopped for now, plan voiding trials tomorrow.  hematuria has resolved.      Problem/Plan-4:  Problem:  Acute Hypoxic Resp Failure  2/2 PE and PNA  Lovenox for PE  10/13/2022 sounds more rhonchi getting cxr , chest pt , inhalers , lasix and speech swallow eval   - still has increased 02 requirements.   - will speak with family regarding GOC      Problem/Plan-5:  Problem: right arm hematoma  - s/p ru sonogram  - likely hematoma  - warm compresses, elevation and continue to monitor for s/s of compartment syndrome    #Dispo  continued inpatient mgmt   d/w daughter at bedside

## 2022-10-18 NOTE — PROCEDURE NOTE - NSINDICATIONS_GEN_A_CORE
drainage
critical patient/monitoring purposes
critical illness/emergency venous access
critical patient/respiratory failure

## 2022-10-18 NOTE — RAPID RESPONSE TEAM SUMMARY - NSADDTLFINDINGSRRT_GEN_ALL_CORE
np suctioning performed and hi-flow O2 started with increase SpO2 to 88%.   ABG: ABG - ( 18 Oct 2022 20:53 )  pH, Arterial: 7.42  pH, Blood: x     /  pCO2: 46    /  pO2: 56    / HCO3: 30    / Base Excess: 4.7   /  SaO2: 89.0      d/w pt's daughter via phone current status and prognosis, daughter reported she never had GOC convo with pt, defers to make any decision re: GOC at this time. Will come to visit- 20 min away.  Pt told CCU MD he wants intubation prn.

## 2022-10-18 NOTE — PROCEDURE NOTE - NSPROCNAME_GEN_A_CORE
Gastric Intubation/Gastric Lavage
Arterial Puncture/Cannulation
Tracheal Intubation
Central Line Insertion

## 2022-10-18 NOTE — PROGRESS NOTE ADULT - SUBJECTIVE AND OBJECTIVE BOX
Patient seen and examined bedside resting comfortably.  CBI running with pink blood tinged urine     T(F): 97.6 (10-18-22 @ 11:15), Max: 98.4 (10-18-22 @ 05:49)  HR: 80 (10-18-22 @ 11:47) (75 - 106)  BP: 107/73 (10-18-22 @ 11:15) (104/70 - 111/67)  RR: 18 (10-18-22 @ 11:15) (18 - 20)  SpO2: 98% (10-18-22 @ 11:47) (94% - 98%)    ROS:  Unable to assess due to mental status         PHYSICAL EXAM:    General: On hi-amanda nasal cannula, responsive to pain  HEENT: NCAT, EOMI, conjunctiva clear  Chest: On hi-amanda nasal cannula,  Abdomen: soft, NT/ND.   Extremities: Calf soft, nontender b/l.   : No suprapubic tenderness or bladder distention. CBI running with pink blood tinged urine     LABS:                        7.4    10.11 )-----------( 224      ( 18 Oct 2022 08:09 )             24.7         I&O's Detail    17 Oct 2022 07:01  -  18 Oct 2022 07:00  --------------------------------------------------------  IN:    Continuous Bladder Irrigation (mL): 7250 mL  Total IN: 7250 mL    OUT:    Continuous Bladder Irrigation (mL): 8620 mL  Total OUT: 8620 mL    Total NET: -1370 mL

## 2022-10-18 NOTE — RAPID RESPONSE TEAM SUMMARY - NSSITUATIONBACKGROUNDRRT_GEN_ALL_CORE
89yo M admitted for septic shock covid pna with increasing O2 demand over past 1-2 days. RRT called for desat to 70s on NRB FiO2 100% with increased work of breathing and wet breath sounds.

## 2022-10-19 NOTE — PROVIDER CONTACT NOTE (EICU) - BACKGROUND
Prolonged hospitalization with chronic illness and covid respiratory failure, now with recurrent respiratory failure, intubated. Repeat ABG 7.11 with hypercapnic (PaCO2 >70) picture (results as of 0153 are not posting in SCM).

## 2022-10-19 NOTE — PROCEDURAL SAFETY CHECKLIST WITH OR WITHOUT SEDATION - NSPRESEDATIONFT_GEN_ALL_CORE
Physician confirms case reviewed for anesthesia consultation requirements.
normal...
Physician confirms case reviewed for anesthesia consultation requirements.

## 2022-10-19 NOTE — PROVIDER CONTACT NOTE (EICU) - RECOMMENDATIONS
- reduce Vt as needed to keep Pplat 30 or less  - increase rate from 28--> 32 (34 even)  - this will require paralysis  - continue diuresis  - repeat ABG in 30-60minutes

## 2022-10-19 NOTE — PROGRESS NOTE ADULT - SUBJECTIVE AND OBJECTIVE BOX
INTERVAL HPI/OVERNIGHT EVENTS:    Intubated overnight now on 3 pressors (max on norepinephrine, on phenylephrine and vasopressin) with persistent shock state.      On 35/500/10/100% with Plateau pressure 30; ABG significant for severe acidosis 7.06/72/73; s/p 2 amps of bicarb this AM.    Minimal urine output this AM.      TTE: Dilated RV, LV grossly intact with normal LV function; IVC >2cm.  B-lines on R with small pleural effusion and L notable for a-line predominant    Repositioned patient into Left lateral decubitus position.     CENTRAL LINE: [ x] YES [ ] NO  LOCATION:       ZENDEJAS: [ x] YES [ ] NO        A-LINE:  [x ] YES [ ] NO  LOCATION:       GLOBAL ISSUE/BEST PRACTICE:  Analgesia:   Sedation:propofol Infusion  HOB elevation: yes  Stress ulcer prophylaxis:   VTE prophylaxis:   Oral Care: Chlorhexidine  Glycemic control:   Nutrition:Diet, NPO (10-18-22 @ 21:14) [Active]    REVIEW OF SYSTEMS: [x] Unable to obtain because: intubated and sedated    PHYSICAL EXAM:  Gen: intubated and sedated  HEENT: Intubated with ETT  CARD -s1s2, RRR, no M,G,R;   PULM - Coarse vented breath sounds, symmetric breath sounds;   ABD -  +BS, ND, NT, soft, no guarding, no rebound, no masses;   EXT - symmetric pulses, no edema;   NEURO - no focal neuro deficits     ICU Vital Signs Last 24 Hrs  T(C): 36.1 (19 Oct 2022 11:00), Max: 37.1 (18 Oct 2022 22:51)  T(F): 96.9 (19 Oct 2022 11:00), Max: 98.7 (18 Oct 2022 22:51)  HR: 118 (19 Oct 2022 13:30) (78 - 124)  BP: 103/46 (18 Oct 2022 22:05) (55/40 - 127/75)  BP(mean): 61 (18 Oct 2022 22:05) (43 - 87)  ABP: 102/50 (19 Oct 2022 13:30) (78/45 - 135/69)  ABP(mean): 66 (19 Oct 2022 13:30) (55 - 86)  RR: 35 (19 Oct 2022 13:30) (16 - 35)  SpO2: 98% (19 Oct 2022 13:30) (70% - 99%)    O2 Parameters below as of 19 Oct 2022 11:59  Patient On (Oxygen Delivery Method): ventilator,100%    I&O's Detail    18 Oct 2022 07:01  -  19 Oct 2022 07:00  --------------------------------------------------------  IN:    Cisatracurium: 83 mL    Continuous Bladder Irrigation (mL): 3550 mL    IV PiggyBack: 50 mL    Norepinephrine: 313.8 mL    Norepinephrine: 693.2 mL    Oral Fluid: 180 mL    Phenylephrine: 45.4 mL    Propofol: 97.8 mL    Vasopressin: 36 mL  Total IN: 5049.2 mL    OUT:    Continuous Bladder Irrigation (mL): 4700 mL    Indwelling Catheter - Urethral (mL): 700 mL    Nasogastric/Oral tube (mL): 1900 mL  Total OUT: 7300 mL    Total NET: -2250.8 mL      19 Oct 2022 07:01  -  19 Oct 2022 13:34  --------------------------------------------------------  IN:    Cisatracurium: 83 mL    Norepinephrine: 680 mL    Phenylephrine: 116.7 mL    Propofol: 62.5 mL    Vasopressin: 30 mL  Total IN: 972.2 mL    OUT:  Total OUT: 0 mL    Total NET: 972.2 mL        MEDICATIONS  NEURO  Meds: cisatracurium Infusion 3 MICROgram(s)/kG/Min (12.5 mL/Hr) IV Continuous <Continuous>  propofol Infusion 10 MICROgram(s)/kG/Min (3.66 mL/Hr) IV Continuous <Continuous>    RESPIRATORY  ABG - ( 19 Oct 2022 08:23 )  pH: 7.06  /  pCO2: 72    /  pO2: 73    / HCO3: 20    / Base Excess: -11.0 /  SaO2: 95.5    Lactate: x                Meds: ALBUTerol    0.083% 2.5 milliGRAM(s) Nebulizer every 6 hours  sodium chloride 3%  Inhalation 4 milliLiter(s) Inhalation every 6 hours    CARDIOVASCULAR  Meds: norepinephrine Infusion 0.05 MICROgram(s)/kG/Min (3.24 mL/Hr) IV Continuous <Continuous>  phenylephrine    Infusion 0.5 MICROgram(s)/kG/Min (6.49 mL/Hr) IV Continuous <Continuous>    GI/NUTRITION  Meds:   GENITOURINARY  Meds: sodium chloride 0.9% lock flush 10 milliLiter(s) IV Push every 1 hour PRN Pre/post blood products, medications, blood draw, and to maintain line patency    HEMATOLOGIC  Meds:   [x] VTE Prophylaxis  INFECTIOUS DISEASES  Meds: meropenem  IVPB 1000 milliGRAM(s) IV Intermittent every 8 hours    ENDOCRINE  CAPILLARY BLOOD GLUCOSE      POCT Blood Glucose.: 125 mg/dL (18 Oct 2022 20:37)    Meds:   OTHER MEDICATIONS:  chlorhexidine 0.12% Liquid 15 milliLiter(s) Oral Mucosa every 12 hours  chlorhexidine 2% Cloths 1 Application(s) Topical <User Schedule>  :    LABS:                        8.2    17.33 )-----------( 348      ( 18 Oct 2022 23:25 )             27.2      10-18    152<H>  |  117<H>  |  34<H>  ----------------------------<  136<H>  4.3   |  29  |  1.19    Ca    9.0      18 Oct 2022 23:20  Phos  5.8     10-18  Mg     2.6     10-18    TPro  5.7<L>  /  Alb  1.6<L>  /  TBili  0.6  /  DBili  x   /  AST  30  /  ALT  35  /  AlkPhos  98  10-18    PT/INR - ( 18 Oct 2022 23:20 )   PT: 13.5 sec;   INR: 1.13 ratio         PTT - ( 18 Oct 2022 23:20 )  PTT:33.3 sec    Mode: AC/ CMV (Assist Control/ Continuous Mandatory Ventilation), RR (machine): 35, TV (machine): 500, FiO2: 100, PEEP: 10, ITime: 1, MAP: 19, PIP: 30    RADIOLOGY & ADDITIONAL STUDIES:

## 2022-10-19 NOTE — PROGRESS NOTE ADULT - ASSESSMENT
88 yo M with HLD, HTN, BPH and history of a AAA repair with a stent graft in [place], recent hospitalization for covid PNA, segmental and subsegmental emboli involving the left lower lobe and acute nonocclusive thrombus in the left mid femoral vein.  Patient was readmitted on 10/7 for recurrent fever, and cough and generalized weakness admitted to ICU for vasopressors and subsequently downgraded to medicine after treatment of sepsis with septic shock 2/2 pneumonia with sputum culture growing ESBL E. Coli.  Patient was noted to require intubation for COVID respiratory failure 2/2 acute hypoxia and hypercarbia.     Noted to be in triple pressor shock, with now developing anuria overnight, with persistent acidosis combined metabolic and respiratory despite maximal vent settings.

## 2022-10-19 NOTE — CHART NOTE - NSCHARTNOTEFT_GEN_A_CORE
EXAM  [x] limited echo  [x] limited lung    INDICATION  [x] hypotension  [x] hypoxia    : Fabiola    FINDINGS:  1. LV appears to be within normal/low normal limits, RV = LV size, IVC without respiratory variation  2. bilateral scattered focal b lines anteriorly, + lung slide bilaterally, R pleural effusion

## 2022-10-19 NOTE — PROVIDER CONTACT NOTE (EICU) - SITUATION
eAlerted by bedside team requesting CXR s/p RIJ insertion, NGT for confirmation.
called by CC ACP to discuss recent abg post intubation for hypoxic respiratory failure

## 2022-10-19 NOTE — PROGRESS NOTE ADULT - ASSESSMENT
A/P  89 year old male with hematuria on lovenox for PE in the setting of Covid PNA, hx of AAA, with CBI     - CBI on hold  - care as per CCU  will discuss with urology attending, full recs to follow

## 2022-10-19 NOTE — PROGRESS NOTE ADULT - REASON FOR ADMISSION
Septic Shock, PNA

## 2022-10-19 NOTE — PROGRESS NOTE ADULT - SUBJECTIVE AND OBJECTIVE BOX
UROLOGY PROGRESS NOTE:     Subjective: Patient seen and examined at bedside. s/p rapid response late yesterday and transfer to CCU for hypoxia/respiratory failure      Objective:  Vital signs  T(F): , Max: 98.7 (10-18-22 @ 22:51)  HR: 120 (10-19-22 @ 11:00)  BP: 103/46 (10-18-22 @ 22:05)  SpO2: 93% (10-19-22 @ 08:30)  Wt(kg): --    Output     I&O's Detail    18 Oct 2022 07:01  -  19 Oct 2022 07:00  --------------------------------------------------------  IN:    Cisatracurium: 83 mL    Continuous Bladder Irrigation (mL): 3550 mL    IV PiggyBack: 50 mL    Norepinephrine: 313.8 mL    Norepinephrine: 693.2 mL    Oral Fluid: 180 mL    Phenylephrine: 45.4 mL    Propofol: 97.8 mL    Vasopressin: 36 mL  Total IN: 5049.2 mL    OUT:    Continuous Bladder Irrigation (mL): 4700 mL    Indwelling Catheter - Urethral (mL): 700 mL    Nasogastric/Oral tube (mL): 1900 mL  Total OUT: 7300 mL    Total NET: -2250.8 mL      19 Oct 2022 07:01  -  19 Oct 2022 11:13  --------------------------------------------------------  IN:    Cisatracurium: 49.8 mL    Norepinephrine: 408 mL    Phenylephrine: 116.7 mL    Propofol: 37.5 mL    Vasopressin: 18 mL  Total IN: 630 mL    OUT:  Total OUT: 0 mL    Total NET: 630 mL          Physical Exam:  Gen: no acute distress  Back: no CVAT b/l  Abd: soft nt nd  : 3way dia in place, cbi  held, urine pink    Labs:                        8.2    17.33 )-----------( 348      ( 18 Oct 2022 23:25 )             27.2     10-18    152<H>  |  117<H>  |  34<H>  ----------------------------<  136<H>  4.3   |  29  |  1.19    Ca    9.0      18 Oct 2022 23:20  Phos  5.8     10-18  Mg     2.6     10-18    TPro  5.7<L>  /  Alb  1.6<L>  /  TBili  0.6  /  DBili  x   /  AST  30  /  ALT  35  /  AlkPhos  98  10-18    PT/INR - ( 18 Oct 2022 23:20 )   PT: 13.5 sec;   INR: 1.13 ratio         PTT - ( 18 Oct 2022 23:20 )  PTT:33.3 sec

## 2022-10-20 NOTE — DISCHARGE NOTE FOR THE EXPIRED PATIENT - HOSPITAL COURSE
89M PMH AAA with endograft 2014 with post procedure expansion of his sac after initial regression and was treated for type II endoleak with coil embolization but sac has increased in size to > 12 cm aortic sac (pending surgical repair). HTN, HLD, chronic systolic heart failure, BPH with recent admission 9/29-10/3 for Covid PNA, segmental and subsegmental emboli involving the left lower lobe and acute nonocclusive thrombus in the left mid femoral vein was not hypoxic and was deferred treatment for covid (no remdesivir/no dexamethasone) started on anticoagulation with eliquis and discharged home presents 10/7 for fever 102.7, weakness and non productive cough. Placed on 6L NC O2 supplementation and found to be hypotensive with L infiltrate on CXR. Started on levophed for BP support. Admitted to ICU initially for septic shock due to ESBL e-coli PNA treated with meropenem-> ertapenem. Downgraded to medical floor 10/7, only required brief vasopressor support. Course with hematuria and acute blood loss anemia s/p total of 4 units pRBC ( 2 units 10/10, and 2 units 10/13), placed on CBI.   RRT called tonight for hypoxia O2 sat 70% on green nasal cannula 10L. FIO2 increased to 15L + NRB with O2 sat only in the low 80s. Pt in respiratory distress, tachypneic with accessory muscle use and agonal breathing along with lethargy. Placed on high flow O2 100% and 50L/min + NRB with o2 sat 88%. Pt with noted JVD, congested respiration. Lasix 40mg IVP given at RRT. GOC addressed with daughter over the phone by medicine team and family unable to make decision. Pt transferred to ICU and intubated.   Patient with MSOF , on triple pressor, family aware of patients worsening health  Time of Death 10/20/2020 7128 89M PMH AAA with endograft 2014 with post procedure expansion of his sac after initial regression and was treated for type II endoleak with coil embolization but sac has increased in size to > 12 cm aortic sac (pending surgical repair). HTN, HLD, chronic systolic heart failure, BPH with recent admission 9/29-10/3 for Covid PNA, segmental and subsegmental emboli involving the left lower lobe and acute nonocclusive thrombus in the left mid femoral vein was not hypoxic and was deferred treatment for covid (no remdesivir/no dexamethasone) started on anticoagulation with eliquis and discharged home presents 10/7 for fever 102.7, weakness and non productive cough. Placed on 6L NC O2 supplementation and found to be hypotensive with L infiltrate on CXR. Started on levophed for BP support. Admitted to ICU initially for septic shock due to ESBL e-coli PNA treated with meropenem-> ertapenem. Downgraded to medical floor 10/7, only required brief vasopressor support. Course with hematuria and acute blood loss anemia s/p total of 4 units pRBC ( 2 units 10/10, and 2 units 10/13), placed on CBI.   RRT called tonight for hypoxia O2 sat 70% on green nasal cannula 10L. FIO2 increased to 15L + NRB with O2 sat only in the low 80s. Pt in respiratory distress, tachypneic with accessory muscle use and agonal breathing along with lethargy. Placed on high flow O2 100% and 50L/min + NRB with o2 sat 88%. Pt with noted JVD, congested respiration. Lasix 40mg IVP given at RRT. GOC addressed with daughter over the phone by medicine team and family unable to make decision. Pt transferred to ICU and intubated for acute hypoxic/hypercarbic respiratory failure.   Patient with MSOF , on triple pressor, family aware of patients worsening health  Time of Death 10/20/2020 1303

## 2022-10-24 LAB
CULTURE RESULTS: SIGNIFICANT CHANGE UP
CULTURE RESULTS: SIGNIFICANT CHANGE UP
SPECIMEN SOURCE: SIGNIFICANT CHANGE UP
SPECIMEN SOURCE: SIGNIFICANT CHANGE UP

## 2022-10-25 ENCOUNTER — APPOINTMENT (OUTPATIENT)
Dept: VASCULAR SURGERY | Facility: CLINIC | Age: 87
End: 2022-10-25

## 2022-10-26 DIAGNOSIS — I11.0 HYPERTENSIVE HEART DISEASE WITH HEART FAILURE: ICD-10-CM

## 2022-10-26 DIAGNOSIS — A41.9 SEPSIS, UNSPECIFIED ORGANISM: ICD-10-CM

## 2022-10-26 DIAGNOSIS — S50.11XA CONTUSION OF RIGHT FOREARM, INITIAL ENCOUNTER: ICD-10-CM

## 2022-10-26 DIAGNOSIS — Z79.01 LONG TERM (CURRENT) USE OF ANTICOAGULANTS: ICD-10-CM

## 2022-10-26 DIAGNOSIS — A41.51 SEPSIS DUE TO ESCHERICHIA COLI [E. COLI]: ICD-10-CM

## 2022-10-26 DIAGNOSIS — I82.412 ACUTE EMBOLISM AND THROMBOSIS OF LEFT FEMORAL VEIN: ICD-10-CM

## 2022-10-26 DIAGNOSIS — G93.41 METABOLIC ENCEPHALOPATHY: ICD-10-CM
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
## 2022-10-26 DIAGNOSIS — E87.6 HYPOKALEMIA: ICD-10-CM

## 2022-10-26 DIAGNOSIS — R31.0 GROSS HEMATURIA: ICD-10-CM

## 2022-10-26 DIAGNOSIS — D62 ACUTE POSTHEMORRHAGIC ANEMIA: ICD-10-CM

## 2022-10-26 DIAGNOSIS — Z79.899 OTHER LONG TERM (CURRENT) DRUG THERAPY: ICD-10-CM

## 2022-10-26 DIAGNOSIS — I26.99 OTHER PULMONARY EMBOLISM WITHOUT ACUTE COR PULMONALE: ICD-10-CM

## 2022-10-26 DIAGNOSIS — R65.21 SEVERE SEPSIS WITH SEPTIC SHOCK: ICD-10-CM

## 2022-10-26 DIAGNOSIS — E87.4 MIXED DISORDER OF ACID-BASE BALANCE: ICD-10-CM

## 2022-10-26 DIAGNOSIS — E86.0 DEHYDRATION: ICD-10-CM

## 2022-10-26 DIAGNOSIS — J12.82 PNEUMONIA DUE TO CORONAVIRUS DISEASE 2019: ICD-10-CM

## 2022-10-26 DIAGNOSIS — Z16.12 EXTENDED SPECTRUM BETA LACTAMASE (ESBL) RESISTANCE: ICD-10-CM

## 2022-10-26 DIAGNOSIS — R64 CACHEXIA: ICD-10-CM

## 2022-10-26 DIAGNOSIS — I50.22 CHRONIC SYSTOLIC (CONGESTIVE) HEART FAILURE: ICD-10-CM

## 2022-10-26 DIAGNOSIS — U07.1 COVID-19: ICD-10-CM

## 2022-10-26 DIAGNOSIS — I71.40 ABDOMINAL AORTIC ANEURYSM, WITHOUT RUPTURE, UNSPECIFIED: ICD-10-CM

## 2022-10-26 DIAGNOSIS — R33.8 OTHER RETENTION OF URINE: ICD-10-CM

## 2022-10-26 DIAGNOSIS — F03.90 UNSPECIFIED DEMENTIA WITHOUT BEHAVIORAL DISTURBANCE: ICD-10-CM
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
## 2022-10-26 DIAGNOSIS — T83.83XA HEMORRHAGE DUE TO GENITOURINARY PROSTHETIC DEVICES, IMPLANTS AND GRAFTS, INITIAL ENCOUNTER: ICD-10-CM
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
## 2022-10-26 DIAGNOSIS — E86.1 HYPOVOLEMIA: ICD-10-CM

## 2022-10-26 DIAGNOSIS — N40.1 BENIGN PROSTATIC HYPERPLASIA WITH LOWER URINARY TRACT SYMPTOMS: ICD-10-CM

## 2022-10-26 DIAGNOSIS — E78.5 HYPERLIPIDEMIA, UNSPECIFIED: ICD-10-CM

## 2022-10-26 DIAGNOSIS — E83.39 OTHER DISORDERS OF PHOSPHORUS METABOLISM: ICD-10-CM

## 2022-10-26 DIAGNOSIS — E88.09 OTHER DISORDERS OF PLASMA-PROTEIN METABOLISM, NOT ELSEWHERE CLASSIFIED: ICD-10-CM

## 2022-10-26 DIAGNOSIS — I26.93 SINGLE SUBSEGMENTAL PULMONARY EMBOLISM WITHOUT ACUTE COR PULMONALE: ICD-10-CM

## 2022-10-26 DIAGNOSIS — J80 ACUTE RESPIRATORY DISTRESS SYNDROME: ICD-10-CM

## 2022-11-13 PROBLEM — I10 BENIGN ESSENTIAL HTN: Status: ACTIVE | Noted: 2022-01-01

## 2022-11-13 PROBLEM — I50.9 CHF (CONGESTIVE HEART FAILURE): Status: ACTIVE | Noted: 2022-01-01

## 2022-11-13 PROBLEM — E78.5 HYPERLIPIDEMIA, MILD: Status: ACTIVE | Noted: 2022-01-01

## 2022-11-13 NOTE — HISTORY OF PRESENT ILLNESS
[FreeTextEntry1] : 89 year old male with h/o HTN, HLD, ?CHF who presents for cardiology followup.  Pt denies any past h/o CAD or leg swelling prior and no CP, SOB or h/A

## 2022-11-13 NOTE — DISCUSSION/SUMMARY
[FreeTextEntry1] : HTN- controlled\par \par HLD- stable\par \par CHF- leg swelling without evidence of LV dysfunction.  Consider ECHO\par \par Followup in 2 mths\par \par Time spent reviewing history, exam, EKG, pt discussion and note writing [EKG obtained to assist in diagnosis and management of assessed problem(s)] : EKG obtained to assist in diagnosis and management of assessed problem(s)

## 2022-11-14 ENCOUNTER — APPOINTMENT (OUTPATIENT)
Dept: VASCULAR SURGERY | Facility: HOSPITAL | Age: 87
End: 2022-11-14

## 2023-03-02 NOTE — ED PROVIDER NOTE - ADMIT DISPOSITION PRESENT ON ADMISSION SEPSIS Q1 - RE-EVALUATED PATIENT FLUID AND VITAL SIGNS
I have re-evaluated the patient's fluid status and reviewed vital signs. Clinical perfusion assessment was performed. Billing Type: Third-Party Bill Expected Date Of Service: 03/02/2023 Performing Laboratory: 0 Bill For Surgical Tray: no

## 2023-04-04 NOTE — H&P ADULT - PROBLEM/PLAN-6
DISPLAY PLAN FREE TEXT Itraconazole Counseling:  I discussed with the patient the risks of itraconazole including but not limited to liver damage, nausea/vomiting, neuropathy, and severe allergy.  The patient understands that this medication is best absorbed when taken with acidic beverages such as non-diet cola or ginger ale.  The patient understands that monitoring is required including baseline LFTs and repeat LFTs at intervals.  The patient understands that they are to contact us or the primary physician if concerning signs are noted.

## 2023-05-08 NOTE — PATIENT PROFILE ADULT - NSPROMEDSADMININFO_GEN_A_NUR
no concerns Burow's Graft Text: The defect edges were debeveled with a #15 scalpel blade.  Given the location of the defect, shape of the defect, the proximity to free margins and the presence of a standing cone deformity a Burow's skin graft was deemed most appropriate. The standing cone was removed and this tissue was then trimmed to the shape of the primary defect. The adipose tissue was also removed until only dermis and epidermis were left.  The skin margins of the secondary defect were undermined to an appropriate distance in all directions utilizing iris scissors.  The secondary defect was closed with interrupted buried subcutaneous sutures.  The skin edges were then re-apposed with running  sutures.  The skin graft was then placed in the primary defect and oriented appropriately.

## 2024-11-06 NOTE — PATIENT PROFILE ADULT - HOW PATIENT ADDRESSED, PROFILE
Patient was seen here at [facility] ED and transferred to [facility]. However upon arrival to the facility, they noted he was bedbound and unable to ambulate, prompting txf back to [facility] ED. Epat notified.  
Silvio